# Patient Record
Sex: FEMALE | Race: WHITE | NOT HISPANIC OR LATINO | Employment: FULL TIME | URBAN - METROPOLITAN AREA
[De-identification: names, ages, dates, MRNs, and addresses within clinical notes are randomized per-mention and may not be internally consistent; named-entity substitution may affect disease eponyms.]

---

## 2017-12-01 ENCOUNTER — TRANSCRIBE ORDERS (OUTPATIENT)
Dept: ADMINISTRATIVE | Facility: HOSPITAL | Age: 42
End: 2017-12-01

## 2017-12-01 ENCOUNTER — ALLSCRIPTS OFFICE VISIT (OUTPATIENT)
Dept: OTHER | Facility: OTHER | Age: 42
End: 2017-12-01

## 2017-12-01 DIAGNOSIS — M51.16 NEURITIS OR RADICULITIS DUE TO RUPTURE OF LUMBAR INTERVERTEBRAL DISC: ICD-10-CM

## 2017-12-01 DIAGNOSIS — M54.16 LUMBAR RADICULOPATHY: ICD-10-CM

## 2017-12-01 DIAGNOSIS — Z98.890 PERSONAL HISTORY OF SURGERY TO HEART AND GREAT VESSELS, PRESENTING HAZARDS TO HEALTH: Primary | ICD-10-CM

## 2017-12-05 NOTE — PROGRESS NOTES
Assessment    1  Lumbar radiculopathy (724 4) (M54 16)   2  Lumbar disc herniation with radiculopathy (722 10) (M51 16)   3  History of lumbar surgery (V15 29) (Z98 890)   4  Family history of dementia (V17 2) (Z81 8) : Mother, Father   5  Family history of PTSD (post-traumatic stress disorder) : Mother, Father   10  Family history of Tachyarrhythmia : Mother, Father   9  History of Lumbar pain with radiation down leg (724 2) (M54 5)   8  History of Laminectomy Lumbar    Plan  Health Maintenance    · 1 - Manjit Lu MD, Mita Lindquist  (Obstetrics/Gynecology) Co-Management  *  Status: Active -Retrospective Authorization  Requested for: [id number]  Care Summary provided  : Yes  History of lumbar surgery, Lumbar disc herniation with radiculopathy, Lumbarradiculopathy    · 1 - Brandie Ca MD, Norma Wiley (Anesthesiology) Co-Management  *  Status: Active  Requestedfor: 13KVK2681  Care Summary provided  : Yes   · * MRI LUMBAR SPINE WO CONTRAST; Status:Need Information - Destin Rolle; Requested for:22Qku0265;   SocHx: Current every day smoker    · You need to quit smoking ; Status:Complete - Retrospective Authorization;   Done:[id number]    Discussion/Summary  Discussion Summary:   Discussed with patient will try to update the MRI of the lumbar spine with her radicular symptoms traveling down the left leg  Chief Complaint  Chief Complaint Free Text Note Form: Here to establish care      History of Present Illness  HPI: Patient here to establish care and just moved to the area  Patient last time was here was about 4 years ago  Patient has no acute issues to complain of but has some chronic issues  Patient reports that she has lower back pain and has a history of surgery discectomy on L5-S1 was done 10 years ago by Dr Kenzie Zelaya patient did well following the surgery, but now has pain again in the same area and similar pain she is experiencing   Pain is traveling down the side of the left leg and calf and hard to stay sitting and walking around  Patient has been having pain that has been getting progressively worse over the past several weeks  Moving around helps  Patient not currently taking any medication other than tylenol  Patient has had an MRI of the lumbar spine in 2/2007 showing a large central and subarticular zone disc herniation L5-S1      Review of Systems  Complete-Female:  Constitutional: as noted in HPI  Eyes: No complaints of eye pain, no red eyes, no eyesight problems, no discharge, no dry eyes, no itching of eyes  ENT: no complaints of earache, no loss of hearing, no nose bleeds, no nasal discharge, no sore throat, no hoarseness  Cardiovascular: No complaints of slow heart rate, no fast heart rate, no chest pain, no palpitations, no leg claudication, no lower extremity edema  Respiratory: No complaints of shortness of breath, no wheezing, no cough, no SOB on exertion, no orthopnea, no PND  Gastrointestinal: No complaints of abdominal pain, no constipation, no nausea or vomiting, no diarrhea, no bloody stools  Genitourinary: No complaints of dysuria, no incontinence, no pelvic pain, no dysmenorrhea, no vaginal discharge or bleeding  Musculoskeletal: as noted in HPI  Integumentary: No complaints of skin rash or lesions, no itching, no skin wounds, no breast pain or lump  Neurological: as noted in HPI  Psychiatric: Not suicidal, no sleep disturbance, no anxiety or depression, no change in personality, no emotional problems  ROS Reviewed:   ROS reviewed  Past Medical History  1  History of Lumbar pain with radiation down leg (724 2) (M54 5)  Active Problems And Past Medical History Reviewed: The active problems and past medical history were reviewed and updated today  Surgical History  1  History of Laminectomy Lumbar  Surgical History Reviewed: The surgical history was reviewed and updated today  Family History  Mother    1  Family history of dementia (V17 2) (Z81 8)   2   Family history of PTSD (post-traumatic stress disorder)   3  Family history of Tachyarrhythmia  Father    4  Family history of dementia (V17 2) (Z81 8)   5  Family history of PTSD (post-traumatic stress disorder)   6  Family history of Tachyarrhythmia  Family History Reviewed: The family history was reviewed and updated today  Social History   · Current every day smoker (305 1) (F17 200)  Social History Reviewed: The social history was reviewed and updated today  Current Meds   1  No Reported Medications Recorded    Allergies  1  No Known Drug Allergies    Vitals  Vital Signs    Recorded: 90OLN1102 10:44AM   Temperature 97 4 F   Heart Rate 68   Systolic 171   Diastolic 76   Height 5 ft 8 in   Weight 139 lb 6 08 oz   BMI Calculated 21 19   BSA Calculated 1 76   O2 Saturation 96       Physical Exam   Constitutional  General appearance: No acute distress, well appearing and well nourished  Pulmonary  Respiratory effort: No increased work of breathing or signs of respiratory distress  Auscultation of lungs: Clear to auscultation  Cardiovascular  Auscultation of heart: Normal rate and rhythm, normal S1 and S2, without murmurs  Examination of extremities for edema and/or varicosities: Normal    Abdomen  Abdomen: Non-tender, no masses  Liver and spleen: No hepatomegaly or splenomegaly  Skin  Skin and subcutaneous tissue: Normal without rashes or lesions  Psychiatric  Orientation to person, place, and time: Normal    Mood and affect: Abnormal  -- in pain  Lumbosacral Spine: Appearance: Normal  Prior lumbar surgery scar  Tenderness: lumbar spine (paraspinal, L5 and S1 )  Palpatory Findings include left-sided muscle spasms  Flexion was painful  Rotation to the left was painful  Rotation to the right was painful  Special Tests: positive Straight Leg Raise-- and-- Pain and shooting pain traveling down left leg and 3-4 lower extremity digitis        Signatures   Electronically signed by : Sebas Scott KRISTINA; Dec  1 2017 11:34AM EST                       (Author)    Electronically signed by : Maritza Verma MD; Dec  1 2017 11:42AM EST

## 2017-12-06 DIAGNOSIS — Z98.890 OTHER SPECIFIED POSTPROCEDURAL STATES: ICD-10-CM

## 2017-12-06 DIAGNOSIS — M51.16 INTERVERTEBRAL DISC DISORDER WITH RADICULOPATHY OF LUMBAR REGION: ICD-10-CM

## 2017-12-06 DIAGNOSIS — M54.16 RADICULOPATHY OF LUMBAR REGION: ICD-10-CM

## 2018-01-23 VITALS
SYSTOLIC BLOOD PRESSURE: 124 MMHG | TEMPERATURE: 97.4 F | DIASTOLIC BLOOD PRESSURE: 76 MMHG | OXYGEN SATURATION: 96 % | HEIGHT: 68 IN | HEART RATE: 68 BPM | WEIGHT: 139.38 LBS | BODY MASS INDEX: 21.12 KG/M2

## 2020-09-23 ENCOUNTER — OFFICE VISIT (OUTPATIENT)
Dept: URGENT CARE | Facility: CLINIC | Age: 45
End: 2020-09-23
Payer: COMMERCIAL

## 2020-09-23 VITALS
OXYGEN SATURATION: 100 % | RESPIRATION RATE: 16 BRPM | SYSTOLIC BLOOD PRESSURE: 132 MMHG | WEIGHT: 154 LBS | TEMPERATURE: 98.2 F | HEART RATE: 95 BPM | HEIGHT: 69 IN | DIASTOLIC BLOOD PRESSURE: 82 MMHG | BODY MASS INDEX: 22.81 KG/M2

## 2020-09-23 DIAGNOSIS — M54.6 ACUTE LEFT-SIDED THORACIC BACK PAIN: Primary | ICD-10-CM

## 2020-09-23 PROCEDURE — G0383 LEV 4 HOSP TYPE B ED VISIT: HCPCS | Performed by: PHYSICIAN ASSISTANT

## 2020-09-23 RX ORDER — IBUPROFEN 600 MG/1
600 TABLET ORAL EVERY 8 HOURS PRN
Qty: 30 TABLET | Refills: 0 | Status: SHIPPED | OUTPATIENT
Start: 2020-09-23

## 2020-09-23 RX ORDER — CYCLOBENZAPRINE HCL 10 MG
10 TABLET ORAL 3 TIMES DAILY PRN
Qty: 20 TABLET | Refills: 0 | Status: SHIPPED | OUTPATIENT
Start: 2020-09-23

## 2020-09-23 NOTE — PATIENT INSTRUCTIONS
Left sided thoracic back is consistent with being muscular in origin as it is reproducible on exam and worse with certain movements  -Apply warm, moist heat  -Gentle stretching  -Use Ibuprofen every 6-8 hours with food  -Use muscle relaxer as directed- do not drive or work while taking this  -Follow-up with PCP within 1 week    Go to ER with worsening symptoms, worsening pain, fever, numbness/tingling, bowel/bladder incontinence, weakness, or any new concerns

## 2022-07-17 PROCEDURE — 90471 IMMUNIZATION ADMIN: CPT

## 2022-07-17 PROCEDURE — 99282 EMERGENCY DEPT VISIT SF MDM: CPT

## 2022-07-18 ENCOUNTER — TELEPHONE (OUTPATIENT)
Dept: OBGYN CLINIC | Facility: HOSPITAL | Age: 47
End: 2022-07-18

## 2022-07-18 ENCOUNTER — HOSPITAL ENCOUNTER (EMERGENCY)
Facility: HOSPITAL | Age: 47
Discharge: HOME | End: 2022-07-18
Attending: EMERGENCY MEDICINE
Payer: COMMERCIAL

## 2022-07-18 VITALS
HEART RATE: 83 BPM | TEMPERATURE: 98.1 F | OXYGEN SATURATION: 98 % | SYSTOLIC BLOOD PRESSURE: 116 MMHG | RESPIRATION RATE: 18 BRPM | DIASTOLIC BLOOD PRESSURE: 62 MMHG

## 2022-07-18 DIAGNOSIS — S61.217A LACERATION OF LEFT LITTLE FINGER WITH TENDON INVOLVEMENT: Primary | ICD-10-CM

## 2022-07-18 PROCEDURE — 90715 TDAP VACCINE 7 YRS/> IM: CPT | Performed by: PHYSICIAN ASSISTANT

## 2022-07-18 PROCEDURE — 99284 EMERGENCY DEPT VISIT MOD MDM: CPT | Performed by: PHYSICIAN ASSISTANT

## 2022-07-18 PROCEDURE — 13121 CMPLX RPR S/A/L 2.6-7.5 CM: CPT | Performed by: PHYSICIAN ASSISTANT

## 2022-07-18 RX ORDER — BACITRACIN, NEOMYCIN, POLYMYXIN B 400; 3.5; 5 [USP'U]/G; MG/G; [USP'U]/G
1 OINTMENT TOPICAL ONCE
Status: COMPLETED | OUTPATIENT
Start: 2022-07-18 | End: 2022-07-18

## 2022-07-18 RX ORDER — LIDOCAINE HYDROCHLORIDE 10 MG/ML
5 INJECTION, SOLUTION EPIDURAL; INFILTRATION; INTRACAUDAL; PERINEURAL ONCE
Status: COMPLETED | OUTPATIENT
Start: 2022-07-18 | End: 2022-07-18

## 2022-07-18 RX ORDER — AMOXICILLIN AND CLAVULANATE POTASSIUM 875; 125 MG/1; MG/1
1 TABLET, FILM COATED ORAL ONCE
Status: COMPLETED | OUTPATIENT
Start: 2022-07-18 | End: 2022-07-18

## 2022-07-18 RX ORDER — AMOXICILLIN AND CLAVULANATE POTASSIUM 875; 125 MG/1; MG/1
1 TABLET, FILM COATED ORAL EVERY 12 HOURS
Qty: 14 TABLET | Refills: 0 | Status: SHIPPED | OUTPATIENT
Start: 2022-07-18 | End: 2022-07-25

## 2022-07-18 RX ADMIN — TETANUS TOXOID, REDUCED DIPHTHERIA TOXOID AND ACELLULAR PERTUSSIS VACCINE, ADSORBED 0.5 ML: 5; 2.5; 8; 8; 2.5 SUSPENSION INTRAMUSCULAR at 06:19

## 2022-07-18 RX ADMIN — LIDOCAINE HYDROCHLORIDE 5 ML: 10 INJECTION, SOLUTION EPIDURAL; INFILTRATION; INTRACAUDAL at 03:20

## 2022-07-18 RX ADMIN — AMOXICILLIN AND CLAVULANATE POTASSIUM 1 TABLET: 875; 125 TABLET, FILM COATED ORAL at 06:19

## 2022-07-18 RX ADMIN — BACITRACIN ZINC, NEOMYCIN, POLYMYXIN B 1 LARGE APPLICATION: 400; 3.5; 5 OINTMENT TOPICAL at 03:20

## 2022-07-18 RX ADMIN — LIDOCAINE HYDROCHLORIDE 5 ML: 10 INJECTION, SOLUTION EPIDURAL; INFILTRATION; INTRACAUDAL; PERINEURAL at 05:32

## 2022-07-18 NOTE — TELEPHONE ENCOUNTER
Hello,  Please advise if the following patient can be forced onto the schedule:    Patient: Tamra Humera     : 1975    MRN: [id number]    Call back #: [phone]    Insurance: [facility]    Reason for appointment: Laceration of left little finger with tendon involvement      Requested doctor/location: [place]      Thank you

## 2022-07-18 NOTE — Clinical Note
Chio Link was seen and treated in our emergency department on 7/17/2022  Diagnosis:     Lacretia Shelbie  may return to work on return date  She may return on this date: 07/22/2022         If you have any questions or concerns, please don't hesitate to call        Td Schneider PA-C    ______________________________           _______________          _______________  Hospital Representative                              Date                                Time

## 2022-07-18 NOTE — ED PROVIDER NOTES
History  Chief Complaint   Patient presents with    Laceration     Pt arrived ambulatory with c/o a laceration on her left hand  Pt was attempting to take her keys off the key ring when she tripped with her finger stuck in the key ring  Bleeding is controlled     Patient is a 49-year-old female with no significant past medical history presenting to the emergency department for evaluation of laceration to her left 5th finger  She states that she was grabbing her key off of a key ring when she fell  Her finger got stuck in the key ring and caused a laceration  She is reporting inability to flex her left 5th finger  There is no head strike or loss of consciousness  Tetanus shot is not up-to-date  No other complaints at this time  Prior to Admission Medications   Prescriptions Last Dose Informant Patient Reported? Taking? cyclobenzaprine (FLEXERIL) 10 mg tablet   No No   Sig: Take 1 tablet (10 mg total) by mouth 3 (three) times a day as needed for muscle spasms   ibuprofen (MOTRIN) 600 mg tablet   No No   Sig: Take 1 tablet (600 mg total) by mouth every 8 (eight) hours as needed for mild pain      Facility-Administered Medications: None       History reviewed  No pertinent past medical history  Past Surgical History:   Procedure Laterality Date    BACK SURGERY  2007    L5-s1       History reviewed  No pertinent family history  I have reviewed and agree with the history as documented  E-Cigarette/Vaping    E-Cigarette Use Never User      E-Cigarette/Vaping Substances     Social History     Tobacco Use    Smoking status: Current Every Day Smoker     Packs/day: 0 50    Smokeless tobacco: Never Used   Vaping Use    Vaping Use: Never used   Substance Use Topics    Alcohol use: Yes    Drug use: Not Currently       Review of Systems   Constitutional: Negative for chills, diaphoresis and fever  HENT: Negative for congestion, facial swelling, nosebleeds, sore throat and voice change      Eyes: Negative for pain and redness  Respiratory: Negative for cough, choking, chest tightness, shortness of breath and stridor  Cardiovascular: Negative for chest pain and palpitations  Musculoskeletal: Negative for arthralgias, back pain, myalgias, neck pain and neck stiffness  Skin: Positive for wound  Negative for color change and rash  Neurological: Negative for dizziness, syncope, facial asymmetry, weakness, light-headedness, numbness and headaches  Psychiatric/Behavioral: Negative for confusion and suicidal ideas  All other systems reviewed and are negative  Physical Exam  Physical Exam  Vitals reviewed  Constitutional:       General: She is not in acute distress  Appearance: Normal appearance  She is not ill-appearing, toxic-appearing or diaphoretic  HENT:      Head: Normocephalic and atraumatic  Right Ear: External ear normal       Left Ear: External ear normal       Nose: Nose normal  No congestion or rhinorrhea  Eyes:      General: No scleral icterus  Right eye: No discharge  Left eye: No discharge  Extraocular Movements: Extraocular movements intact  Conjunctiva/sclera: Conjunctivae normal    Cardiovascular:      Rate and Rhythm: Normal rate and regular rhythm  Pulses: Normal pulses  Heart sounds: Normal heart sounds  No murmur heard  No friction rub  No gallop  Pulmonary:      Effort: Pulmonary effort is normal  No respiratory distress  Breath sounds: Normal breath sounds  No stridor  No wheezing, rhonchi or rales  Musculoskeletal:      Cervical back: Normal range of motion and neck supple  Right lower leg: No edema  Left lower leg: No edema  Skin:     General: Skin is warm and dry  Capillary Refill: Capillary refill takes less than 2 seconds  Findings: Laceration (Complex laceration to the left 5th finger  6 cm in total length    Jagged, vertical   Patient unable to flex left 5th finger at the D IP or PIP, suspicious for tendon rupture ) present  Neurological:      General: No focal deficit present  Mental Status: She is alert and oriented to person, place, and time  Psychiatric:         Mood and Affect: Mood normal          Behavior: Behavior normal          Vital Signs  ED Triage Vitals [07/18/22 0001]   Temperature Pulse Respirations Blood Pressure SpO2   97 7 °F (36 5 °C) 103 19 (!) 171/107 99 %      Temp Source Heart Rate Source Patient Position - Orthostatic VS BP Location FiO2 (%)   Oral Monitor Sitting Left arm --      Pain Score       --           Vitals:    07/18/22 0001 07/18/22 0534   BP: (!) 171/107 116/62   Pulse: 103 83   Patient Position - Orthostatic VS: Sitting          Visual Acuity      ED Medications  Medications   lidocaine (PF) (XYLOCAINE-MPF) 1 % injection 5 mL (5 mL Infiltration Given 7/18/22 0320)   neomycin-bacitracin-polymyxin b (NEOSPORIN) ointment 1 large application (1 large application Topical Given 7/18/22 0320)   lidocaine (PF) (XYLOCAINE-MPF) 1 % injection 5 mL (5 mL Infiltration Given 7/18/22 0532)   amoxicillin-clavulanate (AUGMENTIN) 875-125 mg per tablet 1 tablet (1 tablet Oral Given 7/18/22 0619)   tetanus-diphtheria-acellular pertussis (BOOSTRIX) IM injection 0 5 mL (0 5 mL Intramuscular Given 7/18/22 1979)       Diagnostic Studies  Results Reviewed     None                 No orders to display              Procedures  Laceration repair    Date/Time: 7/18/2022 5:00 AM  Performed by: Varinder Luna PA-C  Authorized by: Varinder Luna PA-C   Consent: Verbal consent obtained    Risks and benefits: risks, benefits and alternatives were discussed  Consent given by: patient  Patient understanding: patient states understanding of the procedure being performed  Patient consent: the patient's understanding of the procedure matches consent given  Procedure consent: procedure consent matches procedure scheduled  Patient identity confirmed: verbally with patient, arm band and hospital-assigned identification number  Body area: upper extremity  Location details: left small finger  Laceration length: 6 cm  Foreign bodies: no foreign bodies  Tendon involvement: complex  Nerve involvement: none  Vascular damage: no  Anesthesia: digital block    Anesthesia:  Local Anesthetic: lidocaine 1% without epinephrine  Anesthetic total: 3 mL    Sedation:  Patient sedated: no      Wound Dehiscence:  Superficial Wound Dehiscence: simple closure      Procedure Details:  Preparation: Patient was prepped and draped in the usual sterile fashion  Irrigation solution: saline  Irrigation method: syringe  Amount of cleaning: extensive  Debridement: none  Degree of undermining: none  Skin closure: 4-0 nylon  Technique: simple  Approximation: close  Approximation difficulty: complex  Dressing: antibiotic ointment, 4x4 sterile gauze and splint  Patient tolerance: patient tolerated the procedure well with no immediate complications               ED Course                                             MDM  Number of Diagnoses or Management Options  Laceration of left little finger with tendon involvement  Diagnosis management comments: Patient presenting for evaluation of complex laceration to her left little finger  She has a 6 cm jagged, vertical, gaping laceration through her left 5th finger  There appears to be tendon involvement as patient is unable to flex through the PIP or D IP  I spoke to orthopedic surgery on-call who recommended approximation of the wound, placing in a splint and starting on oral antibiotics  She should follow up with Hand surgery as soon as possible  Wound was adequately approximated with 4-0 Ethilon after being copiously irrigated  She was initiated on Augmentin  Tetanus shot was updated  She was placed in an ulnar gutter splint by 1 of our emergency department technicians  She was discharged home with instructions to follow-up with hand surgery    Strict return precautions were discussed  She is in stable condition at time of discharge    Patient Progress  Patient progress: stable      Disposition  Final diagnoses:   Laceration of left little finger with tendon involvement     Time reflects when diagnosis was documented in both MDM as applicable and the Disposition within this note     Time User Action Codes Description Comment    7/18/2022  5:47 AM Claire Rowley Add [[id number]] Laceration of left little finger with tendon involvement       ED Disposition     ED Disposition   Discharge    Condition   Stable    Date/Time   Mon Jul 18, 2022  6:11 AM    Comment   Beatriz Hansen discharge to home/self care  Follow-up Information     Follow up With Specialties Details Why Contact Info Additional [address] [facility] Emergency Department Emergency Medicine Go to  If symptoms worsen [facility] Emergency Department, [address] [place], [place], [address] [address], MD Orthopedic Surgery, Hand Surgery Schedule an appointment as soon as possible for a visit  ASAP for follow up [address] [facility] [address]             Discharge Medication List as of 7/18/2022  6:28 AM      START taking these medications    Details   amoxicillin-clavulanate (AUGMENTIN) 875-125 mg per tablet Take 1 tablet by mouth every 12 (twelve) hours for 7 days, Starting Mon 7/18/2022, Until Mon 7/25/2022, Print         CONTINUE these medications which have NOT CHANGED    Details   cyclobenzaprine (FLEXERIL) 10 mg tablet Take 1 tablet (10 mg total) by mouth 3 (three) times a day as needed for muscle spasms, Starting Wed 9/23/2020, Normal      ibuprofen (MOTRIN) 600 mg tablet Take 1 tablet (600 mg total) by mouth every 8 (eight) hours as needed for mild pain, Starting Wed 9/23/2020, Normal             No discharge procedures on file      PDMP Review     None ED Provider  Electronically Signed by           Jamie Nam PA-C  08/01/22 7490

## 2022-07-19 ENCOUNTER — OFFICE VISIT (OUTPATIENT)
Dept: OBGYN CLINIC | Facility: HOSPITAL | Age: 47
End: 2022-07-19
Payer: COMMERCIAL

## 2022-07-19 ENCOUNTER — OFFICE VISIT (OUTPATIENT)
Dept: OCCUPATIONAL THERAPY | Age: 47
End: 2022-07-19

## 2022-07-19 VITALS
HEART RATE: 69 BPM | WEIGHT: 144 LBS | BODY MASS INDEX: 21.33 KG/M2 | HEIGHT: 69 IN | DIASTOLIC BLOOD PRESSURE: 85 MMHG | SYSTOLIC BLOOD PRESSURE: 126 MMHG

## 2022-07-19 DIAGNOSIS — S66.829A FLEXOR TENDON LACERATION OF HAND WITH OPEN WOUND, INITIAL ENCOUNTER: Primary | ICD-10-CM

## 2022-07-19 DIAGNOSIS — S61.209A FLEXOR TENDON LACERATION OF FINGER WITH OPEN WOUND, INITIAL ENCOUNTER: Primary | ICD-10-CM

## 2022-07-19 DIAGNOSIS — S61.409A FLEXOR TENDON LACERATION OF HAND WITH OPEN WOUND, INITIAL ENCOUNTER: Primary | ICD-10-CM

## 2022-07-19 DIAGNOSIS — S56.129A FLEXOR TENDON LACERATION OF FINGER WITH OPEN WOUND, INITIAL ENCOUNTER: Primary | ICD-10-CM

## 2022-07-19 PROCEDURE — 97165 OT EVAL LOW COMPLEX 30 MIN: CPT | Performed by: OCCUPATIONAL THERAPIST

## 2022-07-19 PROCEDURE — 99204 OFFICE O/P NEW MOD 45 MIN: CPT | Performed by: PHYSICIAN ASSISTANT

## 2022-07-19 NOTE — PROGRESS NOTES
ASSESSMENT/PLAN:      Diagnoses and all orders for this visit:    Flexor tendon laceration of left small finger FDP tendon  · Both conservative and operative treatments were discussed with the patient at length  She would like to proceed with conservative measures at this time as it is her nondominant hand small finger  · Wound care was discussed to keep it clean and dry, she can use warm soapy water to run over the incision  · She was advised to attend therapy to work on range of motion and custom ulnar gutter dorsal blocking splint  · Continue antibiotics as prescribed  · She will follow up in 1 week for suture removal    Follow Up:  Return in about 1 week (around 7/26/2022) for for suture removal     Work/school status:  No use of the left upper extremity    To Do Next Visit:  Re-evaluation of current issue, sutures out      Operative Discussions:  Flexor Tendon Repair: The patient has elected to undergo operative repair of a lacerated flexor tendon  During surgery, an incision will be made in the palmar aspect of the hand which will be extended proximally to the level of tendon retraction  The tendon will be delivered distally, and sutured to the remaining portion of the tendon  Postoperatively, the splint will be applied in a protected position  Postoperative physical therapy for up to 12-16 weeks is a necessity to help improve outcomes  The risks of the surgery include tendon rupture, stiffness, pain, incomplete motion, and approximately a 20-30% chance for repeat surgery such as tenolysis  The risks and benefits of the procedure were explained to the patient, which include, but are not limited to: Bleeding, infection, recurrence, pain, scar, damage to tendons, damage to nerves, and damage to blood vessels, failure to give desired results and complications related to anesthesia    These risks, along with alternative conservative treatment options, and postoperative protocols were voiced back and understood by the patient  All questions were answered to the patient's satisfaction  The patient agrees to comply with a standard postoperative protocol, and is willing to proceed  Education was provided via written and auditory forms  There were no barriers to learning  Written handouts regarding wound care, incision and scar care, and general preoperative information was provided to the patient  Prior to surgery, the patient may be requested to stop all anti-inflammatory medications  Prophylactic aspirin, Plavix, and Coumadin may be allowed to be continued  Medications including vitamin E , ginkgo, and fish oil are requested to be stopped approximately one week prior to surgery  Hypertensive medications and beta blockers, if taken, should be continued  CHIEF COMPLAINT:  Chief Complaint   Patient presents with    Left Hand - Pain       SUBJECTIVE:  Jessica Chávez is a 55y o  year old RHD female who presents left small finger pain  Patient states that she was grabbing and he off the key ring when she fell on 7/17/2022  Her finger got stuck in the key ring causing a laceration  She went to the emergency room for evaluation  There, she was unable to flex the digit at the PIP and DIP joint  Laceration was sutured  Tetanus shot was given  She was instructed to follow up Orthopedics  Today she presents with some discomfort in the small finger she states she can feel a pulse in the digit  She denies any numbness or tingling  PAST MEDICAL HISTORY:  History reviewed  No pertinent past medical history  PAST SURGICAL HISTORY:  Past Surgical History:   Procedure Laterality Date    BACK SURGERY  2007    L5-s1       FAMILY HISTORY:  History reviewed  No pertinent family history      SOCIAL HISTORY:  Social History     Tobacco Use    Smoking status: Current Every Day Smoker     Packs/day: 0 50    Smokeless tobacco: Never Used   Vaping Use    Vaping Use: Never used   Substance Use Topics    Alcohol use:  Yes    Drug use: Not Currently       MEDICATIONS:    Current Outpatient Medications:     amoxicillin-clavulanate (AUGMENTIN) 875-125 mg per tablet, Take 1 tablet by mouth every 12 (twelve) hours for 7 days, Disp: 14 tablet, Rfl: 0    cyclobenzaprine (FLEXERIL) 10 mg tablet, Take 1 tablet (10 mg total) by mouth 3 (three) times a day as needed for muscle spasms, Disp: 20 tablet, Rfl: 0    ibuprofen (MOTRIN) 600 mg tablet, Take 1 tablet (600 mg total) by mouth every 8 (eight) hours as needed for mild pain, Disp: 30 tablet, Rfl: 0    ALLERGIES:  No Known Allergies    REVIEW OF SYSTEMS:  Review of Systems  ROS:   General: no fever, no chills  HEENT:  No loss of hearing or eyesight problems  Eyes:  No red eyes  Respiratory:  No coughing, shortness of breath or wheezing  Cardiovascular:  No chest pain, no palpitations  GI:  Abdomen soft nontender, denies nausea  Endocrine:  No muscle weakness, no frequent urination, no excessive thirst  Urinary:  No dysuria, no incontinence  Musculoskeletal: see HPI and PE  SKIN:  No skin rash, no dry skin  Neurological:  No headaches, no confusion  Psychiatric:  No suicide thoughts, no anxiety, no depression  Review of all other systems is negative    VITALS:  Vitals:    07/19/22 0922   BP: 126/85   Pulse: 69       LABS:  HgA1c: No results found for: HGBA1C  BMP: No results found for: GLUCOSE, CALCIUM, NA, K, CO2, CL, BUN, CREATININE    _____________________________________________________  PHYSICAL EXAMINATION:  General: well developed and well nourished, alert, oriented times 3 and appears comfortable  Psychiatric: Normal  HEENT: Trachea Midline, No torticollis  Pulmonary: No audible wheezing or strider  Cardiovascular: No discernable arrhythmia   Skin: No masses, erythema, lacerations, fluctation, ulcerations  Neurovascular: Sensation Intact to the Median, Ulnar, Radial Nerve, Motor Intact to the Median, Ulnar, Radial Nerve and Pulses Intact    MUSCULOSKELETAL EXAMINATION:  Left small finger  Linear laceration with small v shape at the PIP joint  Sutures are intact  Slight maceration of the laceration noted  No active drainage  FDS tendon appears to be intact and able to flex the PIP joint  FDP tendon is not able to flex the DIP joint  Sensation is intact to light touch  Capillary refill less than 2 seconds    ___________________________________________________  STUDIES REVIEWED:  No studies reviewed  PROCEDURES PERFORMED:  Procedures  No Procedures performed today            Portions of the record may have been created with voice recognition software  Occasional wrong word or "sound a like" substitutions may have occurred due to the inherent limitations of voice recognition software  Read the chart carefully and recognize, using context, where substitutions have occurred

## 2022-07-19 NOTE — PROGRESS NOTES
Orthosis    Diagnosis:   1  Flexor tendon laceration of hand with open wound, initial encounter       Indication: Motion Blocking    Location: Left  small finger  Supplies: Custom Fit Orthotic  Orthosis type: Dorsal Block  Wearing Schedule: Remove for hygiene only  Describe Position: Wrist 30 degrees flexion, MPs 60 flexion, Ips 0 ext - added ulnar gutter extension per request    Precautions: Tendon repair,    Patient or Caregiver expresses understanding of wearing Schedule and Precautions? Yes  Patient or Caregiver able to don/doff orthotic independently? Yes    Written orders provided to patient?  No  Orders Obtained: Written  Orders Obtained from: Ramiro Walters    Return for evaluation and treatment Yes

## 2022-07-25 ENCOUNTER — EVALUATION (OUTPATIENT)
Dept: OCCUPATIONAL THERAPY | Facility: CLINIC | Age: 47
End: 2022-07-25
Payer: COMMERCIAL

## 2022-07-25 DIAGNOSIS — S56.129A FLEXOR TENDON LACERATION OF FINGER WITH OPEN WOUND, INITIAL ENCOUNTER: Primary | ICD-10-CM

## 2022-07-25 DIAGNOSIS — S61.209A FLEXOR TENDON LACERATION OF FINGER WITH OPEN WOUND, INITIAL ENCOUNTER: Primary | ICD-10-CM

## 2022-07-25 PROCEDURE — 97140 MANUAL THERAPY 1/> REGIONS: CPT

## 2022-07-25 PROCEDURE — 97166 OT EVAL MOD COMPLEX 45 MIN: CPT

## 2022-07-25 PROCEDURE — 97535 SELF CARE MNGMENT TRAINING: CPT

## 2022-07-25 NOTE — PROGRESS NOTES
OT Evaluation     Today's date: 2022  Patient name: Saida Barrera  : 1975  MRN: [id number]  Referring provider: Tavon Garcia PA-C  Dx:   Encounter Diagnosis     ICD-10-CM    1  Flexor tendon laceration of finger with open wound, initial encounter  S56 129A Ambulatory Referral to PT/OT Hand Therapy    S61 209A                   Assessment  Assessment details: Patient presenting to OP OT services s/p left flexor tendon repair  Patient initially sustained injury on 2022 when the patient was cut it on a key ring  Patient initially went to ER that day  Patient initially saw Ortho on 2022  Patient and surgeon opted to not complete surgery  Patient was placed in a dorsal blocking splint since 2022  Therapist will be following formal tendon repair protocol  Patient does not have a follow-up with Dr Thedora Spatz  Patient is right hand dominant  Patient works as an  and has returned to work since initial injury  Impairments: abnormal or restricted ROM, activity intolerance, impaired physical strength and pain with function    Symptom irritability: moderateBarriers to therapy: No past medical history on file  Understanding of Dx/Px/POC: good   Prognosis: good    Goals  STGs    Pt will increase  strength by 5-10#  - Not Met    Pt will increase digit ROM by 50%  - Not Met     Pt will demonstrate decrease in edema by 25%  - Not Met    Pt will report a decrease in sensation deficits by 25%  - Not Met    Independent with HEP  - Not Met    Patient will verbalize compliance of splint wear - Not Met      LTGs     Pt will increase  strength by an additional 5-10#  - Not Met    Pt will increase digit ROM to WNL  - Not Met    Pt will demonstrate decrease in edema by 50%   - Not Met    Pt will increase pinch strength by 3-5#  - Not Met    Pt will demonstrate an improvement in [facility] as evident by decreased time to complete 9-hole peg test  - Not Met    Pt will report a decrease in sensation deficits by 50%  - Not Met        Plan  Plan details: Patient presenting to OP OT services s/p left flexor tendon repair  Patient demonstrating increased pain, decreased strength, decreased ROM and decreased activity  Pt would benefit from continued Occupational Therapy services one times per week for 6+ weeks to return to prior level of function and achieve all established goals  Patient is limited to number of visits per week secondary to high co-pay   Thank you for the referral!    Patient would benefit from: custom splinting, OT eval and skilled occupational therapy  Referral necessary: Yes  Planned modality interventions: cryotherapy, thermotherapy: hydrocollator packs, thermotherapy: paraffin bath, ultrasound and unattended electrical stimulation  Planned therapy interventions: manual therapy, massage, joint mobilization, fine motor coordination training, dressing changes, coordination, self care, patient education, neuromuscular re-education, strengthening, stretching, therapeutic activities, therapeutic exercise and home exercise program  Frequency: 1x week  Duration in visits: 8  Duration in weeks: 8  Plan of Care beginning date: 2022  Plan of Care expiration date: 2022  Treatment plan discussed with: patient        Subjective Evaluation    History of Present Illness  Date of onset: 2022  Mechanism of injury: surgery  Mechanism of injury: Flexor tendon laceration of left SF          Not a recurrent problem   Quality of life: good    Pain  Current pain ratin  At best pain ratin  At worst pain ratin  Location: Left SF  Quality: dull ache  Relieving factors: medications and ice    Social Support    Employment status: working  Hand dominance: right    Treatments  Current treatment: immobilization and occupational therapy  Patient Goals  Patient goals for therapy: decreased edema, decreased pain, increased motion, increased strength and independence with ADLs/IADLs          Objective     Observations     Left Wrist/Hand   Positive for incision  Additional Observation Details  Patient presenting with a 3 cm incision along left palmar aspect of SF with sutures present  Therapist removed sutures as per protocol   Patient presenting with significant scabbing over incision site    Neurological Testing     Sensation     Wrist/Hand   Left   Diminished: light touch    Right   Intact: light touch  Diminished: light touch    Additional Neurological Details  Sensation deficits along left ulnar aspect of SF and along incision site    Active Range of Motion     Left Wrist   Wrist flexion: 75 degrees   Wrist extension: 65 degrees   Radial deviation: 20 degrees   Ulnar deviation: 20 degrees     Right Wrist   Normal active range of motion    Left Thumb   Opposition: WNL    Right Thumb   Opposition: WNL    Right Digits   Flexion   Little     MCP: 80    PIP: 0    DIP: 0    Additional Active Range of Motion Details  Patient presenting with a 5cm distance from tip of SF to [facility]    Strength/Myotome Testing     Left Wrist/Hand   Wrist extension: 3  Wrist flexion: 3  Radial deviation: 3  Ulnar deviation: 3     (2nd hand position)     Trial 1: 0    Thumb Strength  Key/Lateral Pinch     Trial 1: 0  Tip/Two-Point Pinch     Trial 1: 0  Palmar/Three-Point Pinch     Trial 1: 0    Right Wrist/Hand   Normal wrist strength     (2nd hand position)     Trial 1: 100    Thumb Strength   Key/Lateral Pinch     Trial 1: 15  Tip/Two-Point Pinch     Trial 1: 15  Palmar/Three-Point Pinch     Trial 1: 20    Additional Strength Details   and pinch strength NT on left    Swelling     Left Wrist/Hand   Little     Proximal: 5 8 cm    Middle: 5 6 cm    Distal: 5 cm    Additional Swelling Details  Increased swelling noted through left SF    Right Wrist/Hand   Little     Proximal: 5 cm    Middle: 4 6 cm    Distal: 4 4 cm  Neuro Exam:     Functional outcomes   Right 9 hole peg test: 15 94 (seconds)                Precautions Zone 2 SF Flexor Tendon Laceration       Manuals 07/25/2022       Scar Massage Suture Removal/ Wound Cleaning       IASTM                Neuro Re-Ed  07/25/2022                                                               Ther Ex 07/25/2022       Blocking  DIP  PIP  MCP        Tendon Glide Hook HEP  Table Top HEP       FDS [place]        Wrist Flex/Ext  RD/UD W/ MCP Flexed HEP       Wrist Tenodesis        Digi-Flex        Place and Hold Composite Fist HEP                               Ther Activity 07/25/2022       Grooved Peg Board                                Modalities 07/25/2022        Completed before suture removal       CP        Splint Modification

## 2022-07-29 ENCOUNTER — TELEPHONE (OUTPATIENT)
Dept: OBGYN CLINIC | Facility: HOSPITAL | Age: 47
End: 2022-07-29

## 2022-07-29 NOTE — TELEPHONE ENCOUNTER
Patient states OT removed stitches and finger does not look good, cracking and pussy, please return call  She has picture but not sure how to load on [employer]

## 2022-08-01 ENCOUNTER — OFFICE VISIT (OUTPATIENT)
Dept: OBGYN CLINIC | Facility: CLINIC | Age: 47
End: 2022-08-01
Payer: COMMERCIAL

## 2022-08-01 VITALS
BODY MASS INDEX: 21.33 KG/M2 | SYSTOLIC BLOOD PRESSURE: 118 MMHG | HEIGHT: 69 IN | WEIGHT: 144 LBS | DIASTOLIC BLOOD PRESSURE: 72 MMHG

## 2022-08-01 DIAGNOSIS — S56.129A FLEXOR TENDON LACERATION OF FINGER WITH OPEN WOUND, INITIAL ENCOUNTER: Primary | ICD-10-CM

## 2022-08-01 DIAGNOSIS — S61.209A FLEXOR TENDON LACERATION OF FINGER WITH OPEN WOUND, INITIAL ENCOUNTER: Primary | ICD-10-CM

## 2022-08-01 PROCEDURE — 99214 OFFICE O/P EST MOD 30 MIN: CPT | Performed by: ORTHOPAEDIC SURGERY

## 2022-08-01 NOTE — PROGRESS NOTES
ASSESSMENT/PLAN:    Assessment:   Left small finger FDP tendon laceration     Plan:   Patient elected to proceed without surgical intervention  We did discuss she will never have the ability to flex at the DIP joint due to the laceration of the tendon  Her numbness may improve but may not fully resolve as she likely damage to the nerve with her laceration  Wound is healing well with no signs of infection  She does have slight maceration about the wound and we discussed washing the wound with soap and water daily patting dry and using a nonocclusive dressing such as Coban and about the finger  She may continue with therapy per protocol  Follow Up:  4  week(s)    To Do Next Visit:    wound check      _____________________________________________________  CHIEF COMPLAINT:  Chief Complaint   Patient presents with    Left Little Finger - Pain         SUBJECTIVE:  Dwayne Guo is a 55 y o  female who presents with laceration her left small finger  Patient sustained laceration on 07/17/2022 on a key ring  She did have the inability to flex at the PIP joint after this injury  She was previously seen by PA Emery Gosselin  Patient did have her sutures removed by Occupational therapy  She states initially her wound is very macerated as she was using Neosporin about the wound  Today she notes no significant pain about the finger  The wound has dried out of it as she has been doing Betadine soaks  She denies any signs of infection  She has completed her oral antibiotics  She states she does have a bit of numbness at the ulnar aspect of the small finger  She has been attending hand therapy  She is right-hand dominant  She does not wish to consider surgery for tendon repair  PAST MEDICAL HISTORY:  History reviewed  No pertinent past medical history  PAST SURGICAL HISTORY:  Past Surgical History:   Procedure Laterality Date    BACK SURGERY  2007    L5-s1       FAMILY HISTORY:  History reviewed  No pertinent family history  SOCIAL HISTORY:  Social History     Tobacco Use    Smoking status: Current Every Day Smoker     Packs/day: 0 50    Smokeless tobacco: Never Used   Vaping Use    Vaping Use: Never used   Substance Use Topics    Alcohol use: Yes    Drug use: Not Currently       MEDICATIONS:    Current Outpatient Medications:     ibuprofen (MOTRIN) 600 mg tablet, Take 1 tablet (600 mg total) by mouth every 8 (eight) hours as needed for mild pain, Disp: 30 tablet, Rfl: 0    cyclobenzaprine (FLEXERIL) 10 mg tablet, Take 1 tablet (10 mg total) by mouth 3 (three) times a day as needed for muscle spasms, Disp: 20 tablet, Rfl: 0    ALLERGIES:  No Known Allergies    REVIEW OF SYSTEMS:  Pertinent items are noted in HPI  LABS:  HgA1c: No results found for: HGBA1C  BMP: No results found for: GLUCOSE, CALCIUM, NA, K, CO2, CL, BUN, CREATININE      _____________________________________________________  PHYSICAL EXAMINATION:  Vital signs: Ht 5' 9" (1 753 m)   Wt 65 3 kg (144 lb)   BMI 21 27 kg/m²   General: well developed and well nourished, alert, oriented times 3 and appears comfortable  Psychiatric: Normal  HEENT: Trachea Midline, No torticollis  Cardiovascular: No discernable arrhythmia  Pulmonary: No wheezing or stridor  Abdomen: No rebound or guarding  Extremities: No peripheral edema  Skin: No masses  Neurovascular: Sensation Intact to the Median, Ulnar, Radial Nerve, Motor Intact to the Median, Ulnar, Radial Nerve and Pulses Intact    MUSCULOSKELETAL EXAMINATION:  Left small finger:  Palmar laceration in line with the middle phalanx extending to the distal phalanx  No erythema or drainage noted  Wound is slightly macerated  FDS intact  No function of the FDP noted  Slightly diminished sensation to the ulnar aspect of the distal phalanx    Brisk capillary refill noted  _____________________________________________________  STUDIES REVIEWED:  No Studies to review      PROCEDURES PERFORMED:  Procedures  No Procedures performed today   Scribe Attestation    I,:  Keturah Cooney am acting as a scribe while in the presence of the attending physician :       I,:  Zaria Blackwell MD personally performed the services described in this documentation    as scribed in my presence :

## 2022-08-04 ENCOUNTER — OFFICE VISIT (OUTPATIENT)
Dept: OCCUPATIONAL THERAPY | Facility: CLINIC | Age: 47
End: 2022-08-04
Payer: COMMERCIAL

## 2022-08-04 DIAGNOSIS — S61.209A FLEXOR TENDON LACERATION OF FINGER WITH OPEN WOUND, INITIAL ENCOUNTER: Primary | ICD-10-CM

## 2022-08-04 DIAGNOSIS — S66.829A FLEXOR TENDON LACERATION OF HAND WITH OPEN WOUND, INITIAL ENCOUNTER: ICD-10-CM

## 2022-08-04 DIAGNOSIS — S56.129A FLEXOR TENDON LACERATION OF FINGER WITH OPEN WOUND, INITIAL ENCOUNTER: Primary | ICD-10-CM

## 2022-08-04 DIAGNOSIS — S61.409A FLEXOR TENDON LACERATION OF HAND WITH OPEN WOUND, INITIAL ENCOUNTER: ICD-10-CM

## 2022-08-04 PROCEDURE — 97110 THERAPEUTIC EXERCISES: CPT

## 2022-08-04 PROCEDURE — 97140 MANUAL THERAPY 1/> REGIONS: CPT

## 2022-08-04 NOTE — PROGRESS NOTES
Daily Note     Today's date: 2022  Patient name: Rebecca Byrnes  : 1975  MRN: [id number]  Referring provider: Tonia Waldrop PA-C  Dx:   Encounter Diagnosis     ICD-10-CM    1  Flexor tendon laceration of finger with open wound, initial encounter  S56 129A     S61 209A    2  Flexor tendon laceration of hand with open wound, initial encounter  C01 [id number]     S61 409A                   Subjective: My finger is stiff and numb  It's in the way a bit      Objective: See treatment diary below  AROM LSF PIP -    Assessment: Tolerated treatment well  Patient exhibited good technique with therapeutic exercises  Reviewed HEP  Improved AROM LSF PIP joint  Thick scabbing over wound      Plan: Continue per plan of care           Precautions Zone 2 SF Flexor Tendon Laceration       Date        Visit  2      Manuals 2022       Scar Massage Suture Removal/ Wound Cleaning       IASTM        Edema massage  8' + coban wrap      Neuro Re-Ed  2022                                                               Ther Ex 2022       Blocking  DIP  PIP  MCP        Tendon Glide Hook HEP  Table Top HEP x20 ea      FDS Glide  x20      Wrist Flex/Ext  RD/UD W/ MCP Flexed HEP x20 ea      Wrist Tenodesis  x20      Digi-Flex        Place and Hold Composite Fist HEP x20                              Ther Activity 2022       Grooved Peg Board                                Modalities 2022        Completed before suture removal       CP        Splint Modification

## 2022-08-11 ENCOUNTER — OFFICE VISIT (OUTPATIENT)
Dept: OCCUPATIONAL THERAPY | Facility: CLINIC | Age: 47
End: 2022-08-11
Payer: COMMERCIAL

## 2022-08-11 DIAGNOSIS — S56.129A FLEXOR TENDON LACERATION OF FINGER WITH OPEN WOUND, INITIAL ENCOUNTER: Primary | ICD-10-CM

## 2022-08-11 DIAGNOSIS — S66.829A FLEXOR TENDON LACERATION OF HAND WITH OPEN WOUND, INITIAL ENCOUNTER: ICD-10-CM

## 2022-08-11 DIAGNOSIS — S61.209A FLEXOR TENDON LACERATION OF FINGER WITH OPEN WOUND, INITIAL ENCOUNTER: Primary | ICD-10-CM

## 2022-08-11 DIAGNOSIS — S61.409A FLEXOR TENDON LACERATION OF HAND WITH OPEN WOUND, INITIAL ENCOUNTER: ICD-10-CM

## 2022-08-11 PROCEDURE — 97140 MANUAL THERAPY 1/> REGIONS: CPT

## 2022-08-11 PROCEDURE — 97110 THERAPEUTIC EXERCISES: CPT

## 2022-08-11 NOTE — PROGRESS NOTES
Daily Note     Today's date: 2022  Patient name: Bryan Fatima  : 1975  MRN: [id number]  Referring provider: Cristela Blanca PA-C  Dx:   Encounter Diagnosis     ICD-10-CM    1  Flexor tendon laceration of finger with open wound, initial encounter  S56 129A     S61 209A    2  Flexor tendon laceration of hand with open wound, initial encounter  [id number] [id number]     S61 409A                   Subjective: I think the swelling has gone down      Objective: See treatment diary below      Assessment: Tolerated treatment well  Patient exhibited good technique with therapeutic exercises  Remolded splint due to decreased edema  Debrided 25% of scab from small finger      Plan: Continue per plan of care           Precautions Zone 2 SF Flexor Tendon Laceration       Date      Visit 1 2 3     Manuals 2022       Scar Massage Suture Removal/ Wound Cleaning  Debrided 25% of scab     IASTM        Edema massage  8' + coban wrap 8' + coban     Splint modification   10' due to decrease edema     Neuro Re-Ed  2022                                                               Ther Ex 2022       Passive flexion/active ext in splint   x20     Tendon Glide Hook HEP  Table Top HEP x20 ea x20 ea     FDS Glide  x20 x20     Wrist Flex/Ext  RD/UD W/ MCP Flexed HEP x20 ea      Wrist Tenodesis  x20 x20     Digi-Flex        Place and Hold Composite Fist HEP x20 x20                             Ther Activity 2022       Grooved Peg Board                                Modalities 2022       MH Completed before suture removal  5'     CP

## 2022-08-18 ENCOUNTER — OFFICE VISIT (OUTPATIENT)
Dept: OCCUPATIONAL THERAPY | Facility: CLINIC | Age: 47
End: 2022-08-18
Payer: COMMERCIAL

## 2022-08-18 DIAGNOSIS — S66.829A FLEXOR TENDON LACERATION OF HAND WITH OPEN WOUND, INITIAL ENCOUNTER: ICD-10-CM

## 2022-08-18 DIAGNOSIS — S61.409A FLEXOR TENDON LACERATION OF HAND WITH OPEN WOUND, INITIAL ENCOUNTER: ICD-10-CM

## 2022-08-18 DIAGNOSIS — S61.209A FLEXOR TENDON LACERATION OF FINGER WITH OPEN WOUND, INITIAL ENCOUNTER: Primary | ICD-10-CM

## 2022-08-18 DIAGNOSIS — S56.129A FLEXOR TENDON LACERATION OF FINGER WITH OPEN WOUND, INITIAL ENCOUNTER: Primary | ICD-10-CM

## 2022-08-18 PROCEDURE — 97140 MANUAL THERAPY 1/> REGIONS: CPT

## 2022-08-18 PROCEDURE — 97110 THERAPEUTIC EXERCISES: CPT

## 2022-08-18 NOTE — PROGRESS NOTES
Daily Note     Today's date: 2022  Patient name: Bryan Fatima  : 1975  MRN: [id number]  Referring provider: Cristela Blanca PA-C  Dx:   Encounter Diagnosis     ICD-10-CM    1  Flexor tendon laceration of finger with open wound, initial encounter  S56 129A     S61 209A    2  Flexor tendon laceration of hand with open wound, initial encounter  [id number] [id number]     S61 409A                   Subjective: My hand feels pretty good  Just discomfort when my finger gets cold  Objective: See treatment diary below      Assessment: Tolerated treatment well  Patient exhibited good technique with therapeutic exercises  Patient is now 4 weeks post injury  Cut down splint to allow wrist extension to 45 degrees      Plan: Continue per plan of care           Precautions Zone 2 SF Flexor Tendon Laceration       Date     Visit 1 2 3 4    Manuals 2022       Scar Massage Suture Removal/ Wound Cleaning  Debrided 25% of scab 5'    IASTM        Edema massage  8' + coban wrap 8' + coban 3'    Splint modification   10' due to decrease edema     Neuro Re-Ed  2022                                                               Ther Ex 2022       Passive flexion/active ext in splint   x20 x20    Tendon Glide Hook HEP  Table Top HEP x20 ea x20 ea     FDS Glide  x20 x20     Wrist Flex/Ext  RD/UD W/ MCP Flexed HEP x20 ea  x20    Wrist Tenodesis  x20 x20 x20    Digi-Flex        Place and Hold Composite Fist HEP x20 x20 x20                            Ther Activity 2022       Grooved Peg Board        Opposition    Lg and small colored pegs                    Modalities 2022       MH Completed before suture removal  5' 5'    CP

## 2022-08-25 ENCOUNTER — OFFICE VISIT (OUTPATIENT)
Dept: OCCUPATIONAL THERAPY | Facility: CLINIC | Age: 47
End: 2022-08-25
Payer: COMMERCIAL

## 2022-08-25 DIAGNOSIS — S61.209A FLEXOR TENDON LACERATION OF FINGER WITH OPEN WOUND, INITIAL ENCOUNTER: Primary | ICD-10-CM

## 2022-08-25 DIAGNOSIS — S56.129A FLEXOR TENDON LACERATION OF FINGER WITH OPEN WOUND, INITIAL ENCOUNTER: Primary | ICD-10-CM

## 2022-08-25 DIAGNOSIS — S66.829A FLEXOR TENDON LACERATION OF HAND WITH OPEN WOUND, INITIAL ENCOUNTER: ICD-10-CM

## 2022-08-25 DIAGNOSIS — S61.409A FLEXOR TENDON LACERATION OF HAND WITH OPEN WOUND, INITIAL ENCOUNTER: ICD-10-CM

## 2022-08-25 PROCEDURE — 97140 MANUAL THERAPY 1/> REGIONS: CPT

## 2022-08-25 PROCEDURE — 97110 THERAPEUTIC EXERCISES: CPT

## 2022-08-25 NOTE — PROGRESS NOTES
OT Re-Evaluation     Today's date: 2022  Patient name: Nilda Duncan  : 1975  MRN: [id number]  Referring provider: Petra Verdugo PA-C  Dx:   Encounter Diagnosis     ICD-10-CM    1  Flexor tendon laceration of finger with open wound, initial encounter  S56 129A     S61 209A    2  Flexor tendon laceration of hand with open wound, initial encounter  [id number] [id number]     S61 409A                   Assessment  Assessment details: Patient presenting to OP OT services s/p left flexor tendon repair  Patient initially sustained injury on 2022 when the patient was cut it on a key ring  Patient initially went to ER that day  Patient initially saw Ortho on 2022  Patient and surgeon opted to not complete surgery  Patient was placed in a dorsal blocking splint since 2022  Therapist will be following formal tendon repair protocol  Patient does not have a follow-up with Dr López Verma  Patient is right hand dominant  Patient works as an  and has returned to work since initial injury  22: Patient has been seen 5 times in OT  She has been in dorsal blocking splint for 5 weeks  Splint cut down to allow wrist motion 1 week ago  DC splint in 1 week  Incision is now 98% healed  Edema has decreased, but not resolved  Improved sensation and AROM in the small finger and wrist  Strength not tested  Continue OT 1 x/wk x 4 weeks  Impairments: abnormal or restricted ROM, activity intolerance, impaired physical strength and pain with function    Symptom irritability: moderateBarriers to therapy: No past medical history on file  Understanding of Dx/Px/POC: good   Prognosis: good    Goals  STGs    Pt will increase  strength by 5-10#  - Not Adressed    Pt will increase digit ROM by 50%  - Met     Pt will demonstrate decrease in edema by 25%  -  Met    Pt will report a decrease in sensation deficits by 25%   -  Met    Independent with HEP  -  Met    Patient will verbalize compliance of splint wear -  Met      LTGs     Pt will increase  strength by an additional 5-10#  - Not Met    Pt will increase digit ROM to WNL  - Not Met    Pt will demonstrate decrease in edema by 50%  - Not Met    Pt will increase pinch strength by 3-5#  - Not Met    Pt will demonstrate an improvement in [address] as evident by decreased time to complete 9-hole peg test  - Not Met    Pt will report a decrease in sensation deficits by 50%  -  Met        Plan  Plan details: Patient presenting to OP OT services s/p left flexor tendon repair  Patient demonstrating increased pain, decreased strength, decreased ROM and decreased activity  Pt would benefit from continued Occupational Therapy services one times per week for 6+ weeks to return to prior level of function and achieve all established goals  Patient is limited to number of visits per week secondary to high co-pay   Thank you for the referral!    8/25/22: Continue OT 1x/wk x 8 weeks    Patient would benefit from: custom splinting and skilled occupational therapy  Referral necessary: Yes  Planned modality interventions: cryotherapy, thermotherapy: hydrocollator packs, thermotherapy: paraffin bath, ultrasound and unattended electrical stimulation  Planned therapy interventions: manual therapy, massage, joint mobilization, fine motor coordination training, dressing changes, coordination, self care, patient education, neuromuscular re-education, strengthening, stretching, therapeutic activities, therapeutic exercise, home exercise program and compression  Other planned therapy interventions: Scar and edema mgt  Frequency: 1x week  Duration in weeks: 12  Plan of Care beginning date: 7/25/2022  Plan of Care expiration date: 10/28/2022  Treatment plan discussed with: patient        Subjective Evaluation    History of Present Illness  Date of onset: 7/17/2022  Mechanism of injury: trauma  Mechanism of injury: Flexor tendon laceration of left SF    8/25/22: It's still hard in the finger and doesn't bend  I think it's scar tissue  I can't really use the hand because of the splint            Not a recurrent problem   Quality of life: good    Pain  Current pain ratin  At best pain ratin  At worst pain ratin  Location: Left SF  Quality: discomfort  Relieving factors: heat  Exacerbated by: Cold weather/temperature  Progression: improved    Social Support    Employment status: working  Hand dominance: right    Treatments  Current treatment: immobilization and occupational therapy  Patient Goals  Patient goals for therapy: decreased edema, decreased pain, increased motion, increased strength and independence with ADLs/IADLs          Objective     Observations     Left Wrist/Hand   Positive for adhesive scar and edema  Additional Observation Details  Patient presenting with a 3 cm incision along left palmar aspect of SF with sutures present  Therapist removed sutures as per protocol  Patient presenting with significant scabbing over incision site    22: Scabbing on digit now 98% removed  Brawny edema small finger    Neurological Testing     Sensation     Wrist/Hand   Left   Intact: light touch    Right   Intact: light touch  Diminished: light touch    Comments   Left light touch: 2 83 all dgits  Decreased sensation on scar     Active Range of Motion     Left Wrist   Normal active range of motion  Wrist flexion: 85 degrees   Wrist extension: 85 degrees   Radial deviation: 20 degrees   Ulnar deviation: 40 degrees     Right Wrist   Normal active range of motion    Left Thumb   Opposition: WNL    Right Thumb   Opposition: WNL    Left Digits    Flexion   Little     MCP: 95    PIP: 70    DIP: 0  Extension   Little     PIP: -5    Right Digits   Flexion   Little     MCP: 80    PIP: 0    DIP: 0    Additional Active Range of Motion Details  22: 3cm distance from tip of SF to [facility] LSF = 160    Strength/Myotome Testing     Left Wrist/Hand   Normal wrist strength     (2nd hand position) Trial 1: 0    Thumb Strength  Key/Lateral Pinch     Trial 1: 0  Tip/Two-Point Pinch     Trial 1: 0  Palmar/Three-Point Pinch     Trial 1: 0    Right Wrist/Hand   Normal wrist strength     (2nd hand position)     Trial 1: 100    Thumb Strength   Key/Lateral Pinch     Trial 1: 15  Tip/Two-Point Pinch     Trial 1: 15  Palmar/Three-Point Pinch     Trial 1: 20    Additional Strength Details   and pinch strength NT on left  8/25/22:  and pinch not tested   Wrist strength WNL    Swelling     Left Wrist/Hand   Little     Proximal: 5 3 cm    Middle: 5 cm    Distal: 4 5 cm    Additional Swelling Details  Increased swelling noted through left SF    Right Wrist/Hand   Little     Proximal: 5 cm    Middle: 4 6 cm    Distal: 4 4 cm  Neuro Exam:     Functional outcomes   Right 9 hole peg test: 15 94 (seconds)                Precautions Zone 2 SF Flexor Tendon Laceration       Date 7/25 8/4 8/11 8/18 8/25   Visit 1 2 3 4 5 Re eval   Manuals 07/25/2022       Scar Massage Suture Removal/ Wound Cleaning  Debrided 25% of scab 5' 5' + eschar deridement   IASTM        Edema massage  8' + coban wrap 8' + coban 3' 5' + coban   Splint modification   10' due to decrease edema     Neuro Re-Ed  07/25/2022                                                               Ther Ex 07/25/2022       Passive flexion/active ext in splint   x20 x20 x20   Tendon Glide Hook HEP  Table Top HEP x20 ea x20 ea     FDS Glide  x20 x20  x20   Wrist Flex/Ext  RD/UD W/ MCP Flexed HEP x20 ea  x20 x20   Wrist Tenodesis  x20 x20 x20 x20   Digi-Flex        Place and Hold Composite Fist HEP x20 x20 x20 x20                           Ther Activity 07/25/2022       Grooved Peg Board        Opposition    Lg and small colored pegs                    Modalities 07/25/2022       MH Completed before suture removal  5' 5' 5'   CP

## 2022-08-30 ENCOUNTER — TELEPHONE (OUTPATIENT)
Dept: OBGYN CLINIC | Facility: HOSPITAL | Age: 47
End: 2022-08-30

## 2022-09-01 ENCOUNTER — OFFICE VISIT (OUTPATIENT)
Dept: OCCUPATIONAL THERAPY | Facility: CLINIC | Age: 47
End: 2022-09-01
Payer: COMMERCIAL

## 2022-09-01 DIAGNOSIS — S61.409A FLEXOR TENDON LACERATION OF HAND WITH OPEN WOUND, INITIAL ENCOUNTER: ICD-10-CM

## 2022-09-01 DIAGNOSIS — S56.129D FLEXOR TENDON LACERATION OF FINGER WITH OPEN WOUND, SUBSEQUENT ENCOUNTER: Primary | ICD-10-CM

## 2022-09-01 DIAGNOSIS — S61.209D FLEXOR TENDON LACERATION OF FINGER WITH OPEN WOUND, SUBSEQUENT ENCOUNTER: Primary | ICD-10-CM

## 2022-09-01 DIAGNOSIS — S66.829A FLEXOR TENDON LACERATION OF HAND WITH OPEN WOUND, INITIAL ENCOUNTER: ICD-10-CM

## 2022-09-01 PROCEDURE — 97140 MANUAL THERAPY 1/> REGIONS: CPT

## 2022-09-01 PROCEDURE — 97110 THERAPEUTIC EXERCISES: CPT

## 2022-09-01 NOTE — PROGRESS NOTES
Daily Note     Today's date: 2022  Patient name: Mehdi Mcfarlane  : 1975  MRN: [id number]  Referring provider: Genesis Power PA-C  Dx:   Encounter Diagnosis     ICD-10-CM    1  Flexor tendon laceration of finger with open wound, subsequent encounter  S56 129D     S61 209D    2  Flexor tendon laceration of hand with open wound, initial encounter  C08 220F     S61 409A                   Subjective: I got the rest of the scab off  Objective: See treatment diary below      Assessment: Tolerated treatment well  Patient exhibited good technique with therapeutic exercises  Patient has now been in splint for 6 weeks  DC splint this date  Patient will tequila tape small finger to ring finger during the day and wear coban on small finger at night for edema control      Plan: Continue per plan of care           Precautions [facility] Flexor Tendon Laceration       Date    Visit 6 2 3 4 5 Re eval   Manuals        Scar Massage 5'  Debrided 25% of scab 5' 5' + eschar deridement   IASTM 3'       Edema massage 5' 8' + coban wrap 8' + coban 3' 5' + coban   Splint modification DC splint  10' due to decrease edema     Neuro Re-Ed                                                                 Ther Ex        Passive flexion/active ext in splint DC  x20 x20 x20   Tendon Glide  x20 ea x20 ea     FDS Glide x20 x20 x20  x20   Wrist Flex/Ext  RD/UD W/ MCP Flexed x20 x20 ea  x20 x20   Wrist Tenodesis  x20 x20 x20 x20   Digi-Flex        Place and Hold Composite Fist x20 x20 x20 x20 x20   PROM SF 12'                       Ther Activity        Grooved Peg Board        Opposition    Lg and small colored pegs                    Modalities        MH 5'  5' 5' 5'   CP

## 2022-09-08 ENCOUNTER — OFFICE VISIT (OUTPATIENT)
Dept: OCCUPATIONAL THERAPY | Facility: CLINIC | Age: 47
End: 2022-09-08
Payer: COMMERCIAL

## 2022-09-08 DIAGNOSIS — S61.409A FLEXOR TENDON LACERATION OF HAND WITH OPEN WOUND, INITIAL ENCOUNTER: ICD-10-CM

## 2022-09-08 DIAGNOSIS — S56.129D FLEXOR TENDON LACERATION OF FINGER WITH OPEN WOUND, SUBSEQUENT ENCOUNTER: Primary | ICD-10-CM

## 2022-09-08 DIAGNOSIS — S66.829A FLEXOR TENDON LACERATION OF HAND WITH OPEN WOUND, INITIAL ENCOUNTER: ICD-10-CM

## 2022-09-08 DIAGNOSIS — S61.209A FLEXOR TENDON LACERATION OF FINGER WITH OPEN WOUND, INITIAL ENCOUNTER: ICD-10-CM

## 2022-09-08 DIAGNOSIS — S56.129A FLEXOR TENDON LACERATION OF FINGER WITH OPEN WOUND, INITIAL ENCOUNTER: ICD-10-CM

## 2022-09-08 DIAGNOSIS — S61.209D FLEXOR TENDON LACERATION OF FINGER WITH OPEN WOUND, SUBSEQUENT ENCOUNTER: Primary | ICD-10-CM

## 2022-09-08 PROCEDURE — 97110 THERAPEUTIC EXERCISES: CPT

## 2022-09-08 PROCEDURE — 97140 MANUAL THERAPY 1/> REGIONS: CPT

## 2022-09-08 NOTE — TELEPHONE ENCOUNTER
Pt called and left a vm stating she needs to reschedule her appt for tomorrow with Davon Greene     Pt can be reached at [phone]

## 2022-09-08 NOTE — PROGRESS NOTES
Daily Note     Today's date: 2022  Patient name: Tesfaye Hernandez  : 1975  MRN: [id number]  Referring provider: Matheus Sanchez PA-C  Dx:   Encounter Diagnosis     ICD-10-CM    1  Flexor tendon laceration of finger with open wound, subsequent encounter  S56 129D     S61 209D    2  Flexor tendon laceration of hand with open wound, initial encounter  Y33 753X     S61 409A    3  Flexor tendon laceration of finger with open wound, initial encounter  S56 129A     S61 209A                   Subjective: I can move better, but it's still stiff      Objective: See treatment diary below      Assessment: Tolerated treatment well  Patient exhibited good technique with therapeutic exercises  Fabricated and issued DIP static flexion splint in 20 degees flexion to wear during the day to encourage PIP flexion      Plan: Continue per plan of care           Precautions [address] [facility] Flexor Tendon Laceration       Date    Visit 6 7 3 4 5 Re eval   Manuals        Scar Massage 5'  Debrided 25% of scab 5' 5' + eschar deridement   IASTM 3'       Edema massage 5' 8'  8' + coban 3' 5' + coban   Splint modification DC splint Static, dorsal PIP flexion in 20 degrees 10' due to decrease edema     Neuro Re-Ed                                                                 Ther Ex        Passive flexion/active ext in splint DC  x20 x20 x20   Tendon Glide  x20 ea x20 ea     FDS Glide x20 x20 x20  x20   Wrist Flex/Ext  RD/UD W/ MCP Flexed x20 x20 ea  x20 x20   Wrist Tenodesis   x20 x20 x20   Digi-Flex        Place and Hold Composite Fist x20 x20 x20 x20 x20   PROM SF 12' 10'                      Ther Activity        Grooved Peg Board        Opposition    Lg and small colored pegs                    Modalities        MH 5' 5' 5' 5' 5'   CP

## 2022-09-15 ENCOUNTER — OFFICE VISIT (OUTPATIENT)
Dept: OCCUPATIONAL THERAPY | Facility: CLINIC | Age: 47
End: 2022-09-15
Payer: COMMERCIAL

## 2022-09-15 DIAGNOSIS — S56.129A FLEXOR TENDON LACERATION OF FINGER WITH OPEN WOUND, INITIAL ENCOUNTER: ICD-10-CM

## 2022-09-15 DIAGNOSIS — S56.129D FLEXOR TENDON LACERATION OF FINGER WITH OPEN WOUND, SUBSEQUENT ENCOUNTER: Primary | ICD-10-CM

## 2022-09-15 DIAGNOSIS — S61.209D FLEXOR TENDON LACERATION OF FINGER WITH OPEN WOUND, SUBSEQUENT ENCOUNTER: Primary | ICD-10-CM

## 2022-09-15 DIAGNOSIS — S61.409A FLEXOR TENDON LACERATION OF HAND WITH OPEN WOUND, INITIAL ENCOUNTER: ICD-10-CM

## 2022-09-15 DIAGNOSIS — S66.829A FLEXOR TENDON LACERATION OF HAND WITH OPEN WOUND, INITIAL ENCOUNTER: ICD-10-CM

## 2022-09-15 DIAGNOSIS — S61.209A FLEXOR TENDON LACERATION OF FINGER WITH OPEN WOUND, INITIAL ENCOUNTER: ICD-10-CM

## 2022-09-15 PROCEDURE — 97140 MANUAL THERAPY 1/> REGIONS: CPT

## 2022-09-15 PROCEDURE — 97110 THERAPEUTIC EXERCISES: CPT

## 2022-09-15 NOTE — PROGRESS NOTES
Daily Note     Today's date: 9/15/2022  Patient name: Jessica Chávez  : 1975  MRN: [id number]  Referring provider: Álvaro King PA-C  Dx:   Encounter Diagnosis     ICD-10-CM    1  Flexor tendon laceration of finger with open wound, subsequent encounter  S56 129D     S61 209D    2  Flexor tendon laceration of hand with open wound, initial encounter  K79 334D     S61 409A    3  Flexor tendon laceration of finger with open wound, initial encounter  Z73 905T     X51 169S                   Subjective: My finger is less stiff, but I have to work it a lot  I'm trying to use it to type, but I make a lot of mistakes      Objective: See treatment diary below      Assessment: Tolerated treatment well  Patient exhibited good technique with therapeutic exercises  PIP flexion 75 degrees  Improved 5 degrees      Plan: Continue per plan of care           Precautions Zone 2 SF Flexor Tendon Laceration       Date 9/1 9/8 9/15 8/18 8/25   Visit 6 7 8 4 5 Re eval   Manuals        Scar Massage 5'   5' 5' + eschar deridement   IASTM 3'  7'     Edema massage 5' 8'  5' 3' 5' + coban   Splint modification DC splint Static, dorsal PIP flexion in 20 degrees      Neuro Re-Ed                                                                 Ther Ex        Passive flexion/active ext in splint DC   x20 x20   Tendon Glide  x20 ea x20 ea     FDS Glide x20 x20 x20  x20   Wrist Flex/Ext  RD/UD W/ MCP Flexed x20 x20 ea  x20 x20   Wrist Tenodesis    x20 x20   Digi-Flex        Place and Hold Composite Fist x20 x20 x20 x20 x20   PROM SF 12' 10' 10'     Sustained grasp w/pencils   2 sets             Ther Activity        Grooved Peg Board        Opposition    Lg and small colored pegs                    Modalities        MH 5' 5' 5' 5' 5'   CP

## 2022-10-06 ENCOUNTER — OFFICE VISIT (OUTPATIENT)
Dept: OCCUPATIONAL THERAPY | Facility: CLINIC | Age: 47
End: 2022-10-06
Payer: COMMERCIAL

## 2022-10-06 DIAGNOSIS — S56.129D FLEXOR TENDON LACERATION OF FINGER WITH OPEN WOUND, SUBSEQUENT ENCOUNTER: Primary | ICD-10-CM

## 2022-10-06 DIAGNOSIS — S61.409A FLEXOR TENDON LACERATION OF HAND WITH OPEN WOUND, INITIAL ENCOUNTER: ICD-10-CM

## 2022-10-06 DIAGNOSIS — S61.209D FLEXOR TENDON LACERATION OF FINGER WITH OPEN WOUND, SUBSEQUENT ENCOUNTER: Primary | ICD-10-CM

## 2022-10-06 DIAGNOSIS — S61.209A FLEXOR TENDON LACERATION OF FINGER WITH OPEN WOUND, INITIAL ENCOUNTER: ICD-10-CM

## 2022-10-06 DIAGNOSIS — S66.829A FLEXOR TENDON LACERATION OF HAND WITH OPEN WOUND, INITIAL ENCOUNTER: ICD-10-CM

## 2022-10-06 DIAGNOSIS — S56.129A FLEXOR TENDON LACERATION OF FINGER WITH OPEN WOUND, INITIAL ENCOUNTER: ICD-10-CM

## 2022-10-06 PROCEDURE — 97110 THERAPEUTIC EXERCISES: CPT

## 2022-10-06 PROCEDURE — 97140 MANUAL THERAPY 1/> REGIONS: CPT

## 2022-10-06 NOTE — PROGRESS NOTES
OT Re-Evaluation     Today's date: 10/6/2022  Patient name: Nilda Duncan  : 1975  MRN: [id number]  Referring provider: Petra Verdugo PA-C  Dx:   Encounter Diagnosis     ICD-10-CM    1  Flexor tendon laceration of finger with open wound, subsequent encounter  S56 129D     S61 209D    2  Flexor tendon laceration of hand with open wound, initial encounter  P49 073P     S61 409A    3  Flexor tendon laceration of finger with open wound, initial encounter  Y25 636O     U93 902Q                   Assessment  Assessment details: Patient presenting to OP OT services s/p left flexor tendon repair  Patient initially sustained injury on 2022 when the patient was cut it on a key ring  Patient initially went to ER that day  Patient initially saw Ortho on 2022  Patient and surgeon opted to not complete surgery  Patient was placed in a dorsal blocking splint since 2022  Therapist will be following formal tendon repair protocol  Patient does not have a follow-up with Dr López Verma  Patient is right hand dominant  Patient works as an  and has returned to work since initial injury  22: Patient has been seen 5 times in OT  She has been in dorsal blocking splint for 5 weeks  Splint cut down to allow wrist motion 1 week ago  DC splint in 1 week  Incision is now 98% healed  Edema has decreased, but not resolved  Improved sensation and AROM in the small finger and wrist  Strength not tested  Continue OT 1 x/wk x 4 weeks    10/6/22: Patient seen 4 times in OT this period, but she has not been seen in the past 2 weeks  Small improvement in LSF ROM and edema  The small finger remains edematous, with dense scar tissue that limits motion  Initial hand strength measurements indicate pinch strength WNL, but mild deficit in  strength noted  Only minimal discomfort reported   Continue OT x 3 more visits  Impairments: abnormal or restricted ROM, activity intolerance, impaired physical strength and pain with function    Symptom irritability: moderateBarriers to therapy: No past medical history on file  Understanding of Dx/Px/POC: good   Prognosis: good    Goals  STGs  Pt will increase  strength by 5-10#  - Initiated  Pt will increase digit ROM by 50%  - Met   Pt will demonstrate decrease in edema by 25%  -  Met  Pt will report an increase in sensation deficits by 25%  -  Met  Independent with HEP  -  Met  Patient will verbalize compliance of splint wear -  Met  LTGs   Pt will increase  strength by an additional 5-10#  - Not Met  Pt will increase digit ROM to WNL  - Not Met  Pt will demonstrate decrease in edema by 50%  - Not Met  Pt will increase pinch strength by 3-5#  - Met  Pt will demonstrate an improvement in [facility] as evident by decreased time to complete 9-hole peg test  - Not Met  Pt will report an increase in sensation deficits by 50%  -  Met        Plan  Plan details: Patient presenting to OP OT services s/p left flexor tendon repair  Patient demonstrating increased pain, decreased strength, decreased ROM and decreased activity  Pt would benefit from continued Occupational Therapy services one times per week for 6+ weeks to return to prior level of function and achieve all established goals  Patient is limited to number of visits per week secondary to high co-pay   Thank you for the referral!    8/25/22: Continue OT 1x/wk x 8 weeks    10/6/22: OT 1x/wk x 3-4 weeks    Patient would benefit from: custom splinting and skilled occupational therapy  Referral necessary: Yes  Planned modality interventions: cryotherapy, thermotherapy: hydrocollator packs, thermotherapy: paraffin bath, ultrasound and unattended electrical stimulation  Planned therapy interventions: manual therapy, massage, joint mobilization, fine motor coordination training, dressing changes, coordination, self care, patient education, neuromuscular re-education, strengthening, stretching, therapeutic activities, therapeutic exercise, home exercise program and compression  Other planned therapy interventions: Scar and edema mgt  Frequency: 1x week  Duration in weeks: 12  Plan of Care beginning date: 2022  Plan of Care expiration date: 2022  Treatment plan discussed with: patient        Subjective Evaluation    History of Present Illness  Date of onset: 2022  Mechanism of injury: trauma  Mechanism of injury: Flexor tendon laceration of left SF    22: It's still hard in the finger and doesn't bend  I think it's scar tissue  I can't really use the hand because of the splint    10/6/22: My little finger still gets in the way, but I try to use it as much as possible          Not a recurrent problem   Quality of life: good    Pain  Current pain ratin  At best pain ratin  At worst pain ratin  Location: Left SF  Quality: discomfort  Relieving factors: heat  Exacerbated by: Cold weather/temperature  Progression: resolved    Social Support    Employment status: working  Hand dominance: right    Treatments  Current treatment: immobilization and occupational therapy  Patient Goals  Patient goals for therapy: decreased edema, decreased pain, increased motion, increased strength and independence with ADLs/IADLs          Objective     Observations     Left Wrist/Hand   Positive for adhesive scar and edema  Additional Observation Details  Patient presenting with a 3 cm incision along left palmar aspect of SF with sutures present  Therapist removed sutures as per protocol  Patient presenting with significant scabbing over incision site    22: Scabbing on digit now 98% removed  Brawny edema small finger    10/6/22: Dense scar tissue    Neurological Testing     Sensation     Wrist/Hand   Left   Intact: light touch    Right   Intact: light touch  Diminished: light touch    Comments   Left light touch: 2 83 all dgits   Decreased sensation on scar     Active Range of Motion     Left Wrist   Normal active range of motion  Wrist flexion: 85 degrees   Wrist extension: 85 degrees   Radial deviation: 20 degrees   Ulnar deviation: 40 degrees     Right Wrist   Normal active range of motion    Left Thumb   Opposition: WNL    Right Thumb   Opposition: WNL    Left Digits    Flexion   Little     MCP: 95    PIP: 70    DIP: 0  Extension   Little     PIP: -3    Right Digits   Flexion   Little     MCP: 80    PIP: 0    DIP: 0    Additional Active Range of Motion Details  8/25/22: 3cm distance from tip of SF to [facility]  JONES LSF = 160  10/6/22: JONES = 162    Strength/Myotome Testing     Left Wrist/Hand   Normal wrist strength     (2nd hand position)     Trial 1: 77    Thumb Strength  Key/Lateral Pinch     Trial 1: 18  Tip/Two-Point Pinch     Trial 1: 16  Palmar/Three-Point Pinch     Trial 1: 20    Right Wrist/Hand   Normal wrist strength     (2nd hand position)     Trial 1: 90    Thumb Strength   Key/Lateral Pinch     Trial 1: 15  Tip/Two-Point Pinch     Trial 1: 11 5  Palmar/Three-Point Pinch     Trial 1: 20    Additional Strength Details   and pinch strength NT on left  8/25/22:  and pinch not tested   Wrist strength WNL  10/6/22: Initial  and pinch measurements    Swelling     Left Wrist/Hand   Little     Proximal: 5 5 cm    Middle: 4 7 cm    Distal: 4 cm    Additional Swelling Details  Increased swelling noted through left SF    Right Wrist/Hand   Little     Proximal: 5 cm    Middle: 4 6 cm    Distal: 4 4 cm  Neuro Exam:     Functional outcomes   Right 9 hole peg test: 15 94 (seconds)                Precautions Zone 2 [facility] Flexor Tendon Laceration       Date 9/1 9/8 9/15 10/6 8/25   Visit 6 7 8 9 Re eval 5 Re eval   Manuals        Scar Massage 5'   10' 5' + eschar deridement   IASTM 3'  7' 5'    Edema massage 5' 8'  5'  5' + coban   Splint modification DC splint Static, dorsal PIP flexion in 20 degrees      Neuro Re-Ed                                                                 Ther Ex        Passive flexion/active ext in splint DC    x20   Tendon Glide  x20 ea x20 ea x20    FDS Glide x20 x20 x20 x20 x20   Wrist Flex/Ext  RD/UD W/ MCP Flexed x20 x20 ea  x20 x20   Wrist Tenodesis     x20   Digi-Flex        Place and Hold Composite Fist x20 x20 x20 x20 x20   PROM SF 12' 10' 10' 7'    Sustained grasp w/pencils   2 sets             Ther Activity        Grooved Peg Board        Opposition                        Modalities        MH 5' 5' 5' 5' 5'   CP

## 2022-10-13 ENCOUNTER — OFFICE VISIT (OUTPATIENT)
Dept: OCCUPATIONAL THERAPY | Facility: CLINIC | Age: 47
End: 2022-10-13
Payer: COMMERCIAL

## 2022-10-13 DIAGNOSIS — S56.129D FLEXOR TENDON LACERATION OF FINGER WITH OPEN WOUND, SUBSEQUENT ENCOUNTER: Primary | ICD-10-CM

## 2022-10-13 DIAGNOSIS — S61.209D FLEXOR TENDON LACERATION OF FINGER WITH OPEN WOUND, SUBSEQUENT ENCOUNTER: Primary | ICD-10-CM

## 2022-10-13 PROCEDURE — 97110 THERAPEUTIC EXERCISES: CPT

## 2022-10-13 PROCEDURE — 97140 MANUAL THERAPY 1/> REGIONS: CPT

## 2022-10-20 ENCOUNTER — OFFICE VISIT (OUTPATIENT)
Dept: OCCUPATIONAL THERAPY | Facility: CLINIC | Age: 47
End: 2022-10-20
Payer: COMMERCIAL

## 2022-10-20 DIAGNOSIS — S56.129D FLEXOR TENDON LACERATION OF FINGER WITH OPEN WOUND, SUBSEQUENT ENCOUNTER: Primary | ICD-10-CM

## 2022-10-20 DIAGNOSIS — S61.209D FLEXOR TENDON LACERATION OF FINGER WITH OPEN WOUND, SUBSEQUENT ENCOUNTER: Primary | ICD-10-CM

## 2022-10-20 PROCEDURE — 97140 MANUAL THERAPY 1/> REGIONS: CPT

## 2022-10-20 PROCEDURE — 97110 THERAPEUTIC EXERCISES: CPT

## 2022-10-20 NOTE — PROGRESS NOTES
Daily Note     Today's date: 10/20/2022  Patient name: Carlos Barrera  : 1975  MRN: [id number]  Referring provider: Teresa Davis PA-C  Dx:   Encounter Diagnosis     ICD-10-CM    1  Flexor tendon laceration of finger with open wound, subsequent encounter  S56 129D     S61 209D                   Subjective: It stays swollen  I feel like the bending is staying the same      Objective: See treatment diary below      Assessment: Tolerated treatment well  Patient demonstrated fatigue post treatment  LSF is edematous and PIP flexion still impaired at 70 degrees  Patient is compliant with HEP  Patient states that when she coban wraps the finger, then she has difficulty exercising the finger against the coban    Plan: Continue per plan of care           Precautions Zone 2 [facility] Flexor Tendon Laceration       Date 10/20 9/8 9/15 10/6 10/13   Visit 11 7 8 9 Re eval 10   Manuals        Scar Massage 5'   10' 5'   IASTM 5'  7' 5' 5'   Edema massage 5' 8'  5'  5'    Splint modification  Static, dorsal PIP flexion in 20 degrees      Neuro Re-Ed                                                                 Ther Ex        Tendon Glide  x20 ea x20 ea x20 x20   FDS Glide x20 x20 x20 x20 x20   Wrist Flex/Ext  RD/UD W/ MCP Flexed x20 x20 ea  x20 x20   Wrist Tenodesis     x20   HG  35# x 30    35# x 20   Place and Hold Composite Fist x20 x20 x20 x20 x20   PROM LSF 5' 10' 10' 7' 10'   Sustained grasp w/pencils 2 set  2 sets             Ther Activity        Translation Marbles 1 set       Opposition                        Modalities        MH 5' 5' 5' 5' 5'   CP

## 2022-10-27 ENCOUNTER — OFFICE VISIT (OUTPATIENT)
Dept: OCCUPATIONAL THERAPY | Facility: CLINIC | Age: 47
End: 2022-10-27
Payer: COMMERCIAL

## 2022-10-27 DIAGNOSIS — S56.129D FLEXOR TENDON LACERATION OF FINGER WITH OPEN WOUND, SUBSEQUENT ENCOUNTER: Primary | ICD-10-CM

## 2022-10-27 DIAGNOSIS — S61.209D FLEXOR TENDON LACERATION OF FINGER WITH OPEN WOUND, SUBSEQUENT ENCOUNTER: Primary | ICD-10-CM

## 2022-10-27 PROCEDURE — 97110 THERAPEUTIC EXERCISES: CPT

## 2022-10-27 PROCEDURE — 97140 MANUAL THERAPY 1/> REGIONS: CPT

## 2022-10-27 NOTE — PROGRESS NOTES
Daily Note/Discharge     Today's date: 10/27/2022  Patient name: Nilton Heredia  : 1975  MRN: [id number]  Referring provider: Juan Dent PA-C  Dx:   Encounter Diagnosis     ICD-10-CM    1  Flexor tendon laceration of finger with open wound, subsequent encounter  S56 129D     S61 209D                   Subjective: I only have pain in the finger when it gets cold  It's just stiff      Objective: See treatment diary below  AROM LSF MP 0/95  PIP 0/75 DIP 0/0  JONES = 170   strength = 90 lbs bilaterally    Assessment: Tolerated treatment well  Patient exhibited good technique with therapeutic exercises  Strength is now WNL  AROM continues to slowly improve  Patient is independent in HEP        Plan: Discharge to HEP        Precautions Zone 2 [facility] Flexor Tendon Laceration       Date 10/20 10/27 9/15 10/6 10/13   Visit 11 12 8 9 Re eval 10   Manuals        Scar Massage 5' 5'  10' 5'   IASTM 5' 5' 7' 5' 5'   Edema massage 5'  5'  5'    Splint modification        Neuro Re-Ed                                                                 Ther Ex        Tendon Glide  x20 ea x20 ea x20 x20   FDS Glide x20 x20 x20 x20 x20   Wrist Flex/Ext  RD/UD W/ MCP Flexed x20 x20 ea  x20 x20   Wrist Tenodesis     x20   HG  35# x 30 35# x 30   35# x 20   Place and Hold Composite Fist x20 x20 x20 x20 x20   PROM LSF 5' 10' 10' 7' 10'   Sustained grasp w/pencils 2 set 2 sets 2 sets             Ther Activity        Translation Marbles 1 set       Opposition                        Modalities        MH 5' 5' 5' 5' 5'   CP

## 2023-06-19 ENCOUNTER — OFFICE VISIT (OUTPATIENT)
Dept: FAMILY MEDICINE CLINIC | Facility: CLINIC | Age: 48
End: 2023-06-19
Payer: COMMERCIAL

## 2023-06-19 VITALS
HEIGHT: 67 IN | OXYGEN SATURATION: 98 % | WEIGHT: 150 LBS | HEART RATE: 67 BPM | SYSTOLIC BLOOD PRESSURE: 130 MMHG | DIASTOLIC BLOOD PRESSURE: 82 MMHG | BODY MASS INDEX: 23.54 KG/M2 | TEMPERATURE: 98.5 F

## 2023-06-19 DIAGNOSIS — Z13.1 SCREENING FOR DIABETES MELLITUS: ICD-10-CM

## 2023-06-19 DIAGNOSIS — Z12.31 ENCOUNTER FOR SCREENING MAMMOGRAM FOR MALIGNANT NEOPLASM OF BREAST: ICD-10-CM

## 2023-06-19 DIAGNOSIS — G44.229 CHRONIC TENSION-TYPE HEADACHE, NOT INTRACTABLE: ICD-10-CM

## 2023-06-19 DIAGNOSIS — Z13.220 SCREENING FOR LIPID DISORDERS: ICD-10-CM

## 2023-06-19 DIAGNOSIS — Z01.419 ENCOUNTER FOR ANNUAL ROUTINE GYNECOLOGICAL EXAMINATION: ICD-10-CM

## 2023-06-19 DIAGNOSIS — M54.16 LUMBAR RADICULOPATHY: ICD-10-CM

## 2023-06-19 DIAGNOSIS — R51.9 NONINTRACTABLE HEADACHE, UNSPECIFIED CHRONICITY PATTERN, UNSPECIFIED HEADACHE TYPE: ICD-10-CM

## 2023-06-19 DIAGNOSIS — Z00.00 ANNUAL PHYSICAL EXAM: Primary | ICD-10-CM

## 2023-06-19 DIAGNOSIS — Z12.11 SCREEN FOR COLON CANCER: ICD-10-CM

## 2023-06-19 PROCEDURE — 99386 PREV VISIT NEW AGE 40-64: CPT | Performed by: NURSE PRACTITIONER

## 2023-06-19 NOTE — PROGRESS NOTES
ADULT ANNUAL [facility]    NAME: Milly Hansen  AGE: 52 y o  SEX: female  : 1975     DATE: 2023     Assessment and Plan:     Problem List Items Addressed This Visit        Nervous and Auditory    Lumbar radiculopathy       Other    Encounter for screening mammogram for malignant neoplasm of breast    Relevant Orders    Mammo screening bilateral w 3d & cad    Annual physical exam - Primary    Nonintractable headache     Patient with daily headaches will refer to headache specialist she is taking prn over the counter medications will check labs and patient is interested in botox injections vs a daily medication          Relevant Orders    Ambulatory Referral to Neurology    Comprehensive metabolic panel    CBC and differential    TSH, 3rd generation with Free T4 reflex    Ambulatory Referral to Neurology    Lyme Total Antibody Profile with reflex to WB    Vitamin B12    Screen for colon cancer    Relevant Orders    Cologuard    Encounter for annual routine gynecological examination    Relevant Orders    Ambulatory Referral to Obstetrics / Gynecology    Screening for lipid disorders    Relevant Orders    Lipid Panel with Direct LDL reflex    Screening for diabetes mellitus    Relevant Orders    HEMOGLOBIN A1C W/ EAG ESTIMATION       Immunizations and preventive care screenings were discussed with patient today  Appropriate education was printed on patient's after visit summary  Counseling:  Dental Health: discussed importance of regular tooth brushing, flossing, and dental visits  Injury prevention: discussed safety/seat belts, safety helmets, smoke detectors, carbon dioxide detectors, and smoking near bedding or upholstery  Sexual health: discussed sexually transmitted diseases, partner selection, use of condoms, avoidance of unintended pregnancy, and contraceptive alternatives    Exercise: the importance of regular exercise/physical activity was discussed  Recommend exercise 3-5 times per week for at least 30 minutes  BMI Counseling: Body mass index is 23 85 kg/m²  The BMI is above normal  Nutrition recommendations include decreasing portion sizes, encouraging healthy choices of fruits and vegetables, decreasing fast food intake, consuming healthier snacks, limiting drinks that contain sugar, moderation in carbohydrate intake, increasing intake of lean protein, reducing intake of saturated and trans fat and reducing intake of cholesterol  Exercise recommendations include exercising 3-5 times per week  No pharmacotherapy was ordered  Patient referred to PCP  Rationale for BMI follow-up plan is due to patient being overweight or obese  Depression Screening and Follow-up Plan: Patient was screened for depression during today's encounter  They screened negative with a PHQ-2 score of 0  Return in 1 year (on 6/19/2024)  Chief Complaint:     Chief Complaint   Patient presents with   • Headache     Daily-back of head   • Establish Care      History of Present Illness:     Adult Annual Physical   Patient here for a comprehensive physical exam  The patient reports problems - refer to HPI  Diet and Physical Activity  Diet/Nutrition: well balanced diet  Exercise: no formal exercise  Depression Screening  PHQ-2/9 Depression Screening    Little interest or pleasure in doing things: 0 - not at all  Feeling down, depressed, or hopeless: 0 - not at all  PHQ-2 Score: 0  PHQ-2 Interpretation: Negative depression screen       General Health  Sleep: gets 4-6 hours of sleep on average and unrefreshing sleep  Hearing: normal - bilateral   Vision: vision problems: due for exam and aware of her change in vision  and most recent eye exam >1 year ago  Dental: no dental visits for >1 year and brushes teeth three times daily         /GYN Health  Patient is: perimenopausal  Last menstrual period: 6/5/23 approx  Contraceptive method: not on anything  Review of Systems:   Patient presents for annual physical exam, reports constant pressure in the posterior base of her skull, reports that she has herniated disc in her neck and that she is past due for her eye exam  She does not like taking OTC medications so she has not been taking anything OTC for the headache  Headache    Review of Systems   Constitutional: Negative for chills and fever  HENT: Negative for congestion, ear pain, sinus pressure, sinus pain and sore throat  Eyes: Negative for pain and visual disturbance  Respiratory: Negative for cough and shortness of breath  Cardiovascular: Negative for chest pain, palpitations and leg swelling  Gastrointestinal: Negative for abdominal pain, constipation, diarrhea, nausea and vomiting  Endocrine: Negative  Genitourinary: Negative for dysuria, frequency, hematuria and urgency  Musculoskeletal: Positive for neck stiffness  Negative for arthralgias and back pain  Intermittent stiffness, hx of disc herniation   Skin: Negative for color change and rash  Allergic/Immunologic: Negative  Neurological: Positive for dizziness, light-headedness and headaches  Negative for seizures and syncope  Random, can not correlate with anything specifically  Denies syncope  Resolves quickly on its own  Hematological: Negative  Psychiatric/Behavioral: Negative  Negative for decreased concentration and sleep disturbance  The patient is not nervous/anxious  All other systems reviewed and are negative  Past Medical History:     History reviewed  No pertinent past medical history     Past Surgical History:     Past Surgical History:   Procedure Laterality Date   • BACK SURGERY  2007    L5-s1   • KNEE SURGERY Left 2004   • KNEE SURGERY Left 2007      Social History:     Social History     Socioeconomic History   • Marital status:      Spouse name: None   • Number of children: "None   • Years of education: None   • Highest education level: None   Occupational History   • None   Tobacco Use   • Smoking status: Former     Packs/day: 0 50     Types: Cigarettes     Quit date: 3/8/2022     Years since quittin 2   • Smokeless tobacco: Never   Vaping Use   • Vaping Use: Never used   Substance and Sexual Activity   • Alcohol use: Yes     Comment: socially   • Drug use: Not Currently   • Sexual activity: Yes     Birth control/protection: None   Other Topics Concern   • None   Social History Narrative   • None     Social Determinants of Health     Financial Resource Strain: Not on file   Food Insecurity: Not on file   Transportation Needs: Not on file   Physical Activity: Not on file   Stress: Not on file   Social Connections: Not on file   Intimate Partner Violence: Not on file   Housing Stability: Not on file      Family History:     Family History   Problem Relation Age of Onset   • Heart disease Mother    • Dementia Father       Current Medications:     No current outpatient medications on file  No current facility-administered medications for this visit  Allergies:     No Known Allergies   Physical Exam:     /82   Pulse 67   Temp 98 5 °F (36 9 °C)   Ht 5' 6 5\" (1 689 m)   Wt 68 kg (150 lb)   SpO2 98%   BMI 23 85 kg/m²     Physical Exam  Vitals and nursing note reviewed  Constitutional:       General: She is not in acute distress  Appearance: She is well-developed  HENT:      Head: Normocephalic and atraumatic  Right Ear: Tympanic membrane normal       Left Ear: Tympanic membrane normal       Nose: Nose normal       Mouth/Throat:      Mouth: Mucous membranes are moist    Eyes:      Conjunctiva/sclera: Conjunctivae normal    Cardiovascular:      Rate and Rhythm: Normal rate and regular rhythm  Heart sounds: No murmur heard  Pulmonary:      Effort: Pulmonary effort is normal  No respiratory distress  Breath sounds: Normal breath sounds     Abdominal: " Palpations: Abdomen is soft  Tenderness: There is no abdominal tenderness  Musculoskeletal:         General: No swelling  Cervical back: Neck supple  Skin:     General: Skin is warm and dry  Capillary Refill: Capillary refill takes less than 2 seconds  Neurological:      General: No focal deficit present  Mental Status: She is alert and oriented to person, place, and time     Psychiatric:         Mood and Affect: Mood normal           Paulette Segura, [address]

## 2023-06-19 NOTE — ASSESSMENT & PLAN NOTE
Patient with daily headaches will refer to headache specialist she is taking prn over the counter medications will check labs and patient is interested in botox injections vs a daily medication

## 2023-07-06 ENCOUNTER — OFFICE VISIT (OUTPATIENT)
Dept: URGENT CARE | Facility: CLINIC | Age: 48
End: 2023-07-06
Payer: COMMERCIAL

## 2023-07-06 VITALS
DIASTOLIC BLOOD PRESSURE: 87 MMHG | TEMPERATURE: 98 F | HEART RATE: 68 BPM | RESPIRATION RATE: 18 BRPM | OXYGEN SATURATION: 97 % | SYSTOLIC BLOOD PRESSURE: 157 MMHG

## 2023-07-06 DIAGNOSIS — H61.21 HEARING LOSS OF RIGHT EAR DUE TO CERUMEN IMPACTION: Primary | ICD-10-CM

## 2023-07-06 PROCEDURE — 99213 OFFICE O/P EST LOW 20 MIN: CPT | Performed by: PHYSICIAN ASSISTANT

## 2023-07-06 NOTE — PROGRESS NOTES
[facility]        NAME: Wanda Burger is a 52 y.o. female  : 1975    MRN: [id number]  DATE: 2023  TIME: 9:40 AM    Assessment and Plan   Hearing loss of right ear due to cerumen impaction [H61.21]  1. Hearing loss of right ear due to cerumen impaction              Patient Instructions   Patient Instructions   Follow-up with your primary care provider in the next 3-5 days. Any new or worsening symptoms develop get re-evaluated sooner or proceed to the ER. Follow up with PCP in 3-5 days. Proceed to  ER if symptoms worsen. Chief Complaint     Chief Complaint   Patient presents with   • Earache     She feels like she has a blocked ear. Started 3 days ago. History of Present Illness       Patient presents with right ear feeling blocked. Mildly left ear as well. Tried peroxide, ear drops and ear candles without relief. Ongoing for the past 2-3 days. Some decreased hearing. Denies drainage from the ear, congestion, runny nose, cough, or fevers. Review of Systems   Review of Systems   Constitutional: Negative for fever. HENT: Positive for ear pain and hearing loss. Negative for congestion, ear discharge, mouth sores, postnasal drip, rhinorrhea, sinus pressure, sinus pain, sore throat and trouble swallowing. Psychiatric/Behavioral: Negative for confusion. Current Medications     No current outpatient medications on file. Current Allergies     Allergies as of 2023   • (No Known Allergies)            The following portions of the patient's history were reviewed and updated as appropriate: allergies, current medications, past family history, past medical history, past social history, past surgical history and problem list.     History reviewed. No pertinent past medical history.     Past Surgical History:   Procedure Laterality Date   • BACK SURGERY      L5-s1   • KNEE SURGERY Left    • KNEE SURGERY Left        Family History Problem Relation Age of Onset   • Heart disease Mother    • Dementia Father          Medications have been verified. Objective   /87   Pulse 68   Temp 98 °F (36.7 °C)   Resp 18   SpO2 97%        Physical Exam     Physical Exam  Constitutional:       Appearance: Normal appearance. HENT:      Head: Normocephalic and atraumatic. Right Ear: Tympanic membrane, ear canal and external ear normal. There is impacted cerumen. Left Ear: Tympanic membrane, ear canal and external ear normal.      Ears:      Comments: TM visualized after lavage to be intact and WNL following lavage. Nose: Nose normal.      Mouth/Throat:      Mouth: Mucous membranes are moist.      Pharynx: Oropharynx is clear. Musculoskeletal:      Cervical back: No muscular tenderness. Neurological:      Mental Status: She is alert. Psychiatric:         Mood and Affect: Mood normal.         Behavior: Behavior normal.           Ear cerumen removal    Date/Time: 7/6/2023 8:30 AM    Performed by: Misael Merida PA-C  Authorized by: Misael Merida PA-C  Universal Protocol:  Risks and benefits: risks, benefits and alternatives were discussed  Consent given by: patient  Patient understanding: patient states understanding of the procedure being performed  Patient consent: the patient's understanding of the procedure matches consent given  Procedure consent: procedure consent matches procedure scheduled  Patient identity confirmed: verbally with patient      Patient location:  Clinic  Procedure details:     Local anesthetic:  None    Location:  R ear    Procedure type: irrigation only    Post-procedure details:     Complication:  None    Hearing quality:  Improved    Patient tolerance of procedure: Tolerated well, no immediate complications  Comments:      TM visualized to be intact and WNL following lavage.

## 2023-07-06 NOTE — PATIENT INSTRUCTIONS
Follow-up with your primary care provider in the next 3-5 days. Any new or worsening symptoms develop get re-evaluated sooner or proceed to the ER.

## 2023-07-22 LAB — COLOGUARD RESULT REPORTABLE: NEGATIVE

## 2023-08-18 ENCOUNTER — TELEPHONE (OUTPATIENT)
Dept: NEUROLOGY | Facility: CLINIC | Age: 48
End: 2023-08-18

## 2023-08-18 PROBLEM — Z13.1 SCREENING FOR DIABETES MELLITUS: Status: RESOLVED | Noted: 2023-06-19 | Resolved: 2023-08-18

## 2023-08-18 PROBLEM — Z12.11 SCREEN FOR COLON CANCER: Status: RESOLVED | Noted: 2023-06-19 | Resolved: 2023-08-18

## 2023-08-18 PROBLEM — Z13.220 SCREENING FOR LIPID DISORDERS: Status: RESOLVED | Noted: 2023-06-19 | Resolved: 2023-08-18

## 2023-08-18 NOTE — TELEPHONE ENCOUNTER
Patient calling to schedule new patient appointment for headaches. No testing done. Triage intake sent.

## 2023-09-27 ENCOUNTER — CONSULT (OUTPATIENT)
Dept: NEUROLOGY | Facility: CLINIC | Age: 48
End: 2023-09-27
Payer: COMMERCIAL

## 2023-09-27 VITALS
BODY MASS INDEX: 21.43 KG/M2 | WEIGHT: 144.7 LBS | HEIGHT: 69 IN | HEART RATE: 82 BPM | DIASTOLIC BLOOD PRESSURE: 98 MMHG | SYSTOLIC BLOOD PRESSURE: 150 MMHG | RESPIRATION RATE: 20 BRPM | OXYGEN SATURATION: 99 % | TEMPERATURE: 98.2 F

## 2023-09-27 DIAGNOSIS — M50.20 HERNIATED CERVICAL DISC: ICD-10-CM

## 2023-09-27 DIAGNOSIS — M79.18 CERVICAL MYOFASCIAL PAIN SYNDROME: Primary | ICD-10-CM

## 2023-09-27 DIAGNOSIS — G44.89 HEADACHE SYNDROME: ICD-10-CM

## 2023-09-27 DIAGNOSIS — M54.9 BACK PAIN: ICD-10-CM

## 2023-09-27 PROCEDURE — 99203 OFFICE O/P NEW LOW 30 MIN: CPT | Performed by: STUDENT IN AN ORGANIZED HEALTH CARE EDUCATION/TRAINING PROGRAM

## 2023-09-27 RX ORDER — NAPROXEN 500 MG/1
500 TABLET ORAL 2 TIMES DAILY WITH MEALS
Qty: 14 TABLET | Refills: 0 | Status: SHIPPED | OUTPATIENT
Start: 2023-09-27 | End: 2023-10-04

## 2023-09-27 NOTE — PATIENT INSTRUCTIONS
Additional Testing:   Neurodiagnostic workup: MRI Brain ordered  X-ray cervical spine    Headache Calendar  Please maintain a headache calendar  Consider using phone applications such as Migraine Joselito or [place] Migraine Tracker    Headache/migraine treatment:   Acute medications (for immediate treatment of a headache): It is ok to take ibuprofen, acetaminophen or naproxen (Advil, Tylenol,  Aleve, Excedrin) if they help your headaches you should limit these to No more than 2-3 times a week to avoid medication overuse/rebound headaches. Bridge  Naproxen Bridge  Take 500mg Naproxen twice daily for 7 days  - Avoid other NSAIDs during this time  - Take with food or milk    Lifestyle Recommendations:  [x] SLEEP - Maintain a regular sleep schedule: Adults need at least 7-8 hours of uninterrupted a night. Maintain good sleep hygiene:  Going to bed and waking up at consistent times, avoiding excessive daytime naps, avoiding caffeinated beverages in the evening, avoid excessive stimulation in the evening and generally using bed primarily for sleeping. One hour before bedtime would recommend turning lights down lower, decreasing your activity (may read quietly, listen to music at a low volume). When you get into bed, should eliminate all technology (no texting, emailing, playing with your phone, iPad or tablet in bed). [x] HYDRATION - Maintain good hydration. Drink  2L of fluid a day (4 typical small water bottles)  [x] DIET - Maintain good nutrition. In particular don't skip meals and try and eat healthy balanced meals regularly. [x] TRIGGERS - Look for other triggers and avoid them: Limit caffeine to 1-2 cups a day or less. Avoid dietary triggers that you have noticed bring on your headaches (this could include aged cheese, peanuts, MSG, aspartame and nitrates).   [x] EXERCISE - physical exercise as we all know is good for you in many ways, and not only is good for your heart, but also is beneficial for your mental health, cognitive health and  chronic pain/headaches. I would encourage at the least 5 days of physical exercise weekly for at least 30 minutes. Education and Follow-up  [x] Please call with any questions or concerns. Of course if any new concerning symptoms go to the emergency department.   [x] Follow up in 6 months

## 2023-09-27 NOTE — PROGRESS NOTES
[facility] Consult  PATIENT:  Arianna Sanchez  MRN:  [id number]  :  1975  DATE OF SERVICE:  2023  REFERRED BY: KRISTINA Kendall  PMD: KRISTINA Kendall    Assessment/Plan:     Arianna Sanchez is a very pleasant 50 y.o. female with a past medical history that includes lumbar radiculopathy referred here for evaluation of headache. Initial evaluation 2023     Ms. Hansen reports a new history of head/neck pain that started in February of this year. She denies any history of headaches as a child or growing up, but reports that she has been dealing with chronic back issues and has a history of prior back surgery as well as injections for pain in her spine. She is unsure of any clear inciting incident in February, but reports that is when she first started to notice the pain. It is located at the base of the skull on the right side without any radiation. She does notice that it tends to get worse with movement of her head/neck at times. This leads me to believe that there is a musculoskeletal component to this, but because this is new for her and has been constant since February, I will obtain an MRI of the brain for further evaluation. I have also ordered an x-ray of the cervical spine. She reports that she previously had an MRI of the cervical spine that demonstrated evidence of herniated disks so I have asked her to bring in that imaging and radiology report for our review. She was open to trying a naproxen bridge for 7 days and will update me following that. She is more inclined to try conservative therapy if possible so we can always consider physical therapy in the future if necessary. Cervical myofascial pain syndrome  Herniated cervical disc  Back pain  -     XR spine cervical 2 or 3 vw injury; Future  -     naproxen (Naprosyn) 500 mg tablet;  Take 1 tablet (500 mg total) by mouth 2 (two) times a day with meals for 7 days    Headache syndrome  -     MRI brain with and without contrast; Future    Workup:  - MRI brain with and without contrast  - XR Cervical spine    Preventative:  - we discussed headache hygiene and lifestyle factors that may improve headaches  - Currently on through other providers: None  - Past/ failed/contraindicated: Gabapentin  - future options: Gabapentin, Lyrica, Cymbalta, TCA, ONB    Acute:  - discussed not taking over-the-counter or prescription pain medications more than 2-3 days per week to prevent medication overuse/rebound headache  - Naproxen bridge  - Currently on through other providers: None  - Past/ failed/contraindicated: Flexeril  - future options:  Triptan, prochlorperazine, Toradol IM or p.o., could consider trial of 5 days of Depakote 500 mg nightly or dexamethasone 2 mg daily for prolonged migraine, Ermalinda Oppenheim, reyvow, nurtec  Patient instructions   Additional Testing:   Neurodiagnostic workup: MRI Brain ordered  X-ray cervical spine    Headache Calendar  Please maintain a headache calendar  Consider using phone applications such as Migraine [employer] or [employer] Migraine Tracker    Headache/migraine treatment:   Acute medications (for immediate treatment of a headache): It is ok to take ibuprofen, acetaminophen or naproxen (Advil, Tylenol,  Aleve, Excedrin) if they help your headaches you should limit these to No more than 2-3 times a week to avoid medication overuse/rebound headaches. Bridge  Naproxen Bridge  Take 500mg Naproxen twice daily for 7 days  - Avoid other NSAIDs during this time  - Take with food or milk    Lifestyle Recommendations:  [x] SLEEP - Maintain a regular sleep schedule: Adults need at least 7-8 hours of uninterrupted a night.  Maintain good sleep hygiene:  Going to bed and waking up at consistent times, avoiding excessive daytime naps, avoiding caffeinated beverages in the evening, avoid excessive stimulation in the evening and generally using bed primarily for sleeping. One hour before bedtime would recommend turning lights down lower, decreasing your activity (may read quietly, listen to music at a low volume). When you get into bed, should eliminate all technology (no texting, emailing, playing with your phone, iPad or tablet in bed). [x] HYDRATION - Maintain good hydration. Drink  2L of fluid a day (4 typical small water bottles)  [x] DIET - Maintain good nutrition. In particular don't skip meals and try and eat healthy balanced meals regularly. [x] TRIGGERS - Look for other triggers and avoid them: Limit caffeine to 1-2 cups a day or less. Avoid dietary triggers that you have noticed bring on your headaches (this could include aged cheese, peanuts, MSG, aspartame and nitrates). [x] EXERCISE - physical exercise as we all know is good for you in many ways, and not only is good for your heart, but also is beneficial for your mental health, cognitive health and  chronic pain/headaches. I would encourage at the least 5 days of physical exercise weekly for at least 30 minutes. Education and Follow-up  [x] Please call with any questions or concerns. Of course if any new concerning symptoms go to the emergency department. [x] Follow up in 6 months  CC: We had the pleasure of evaluating Kathie Juarez Rays in neurological consultation today. Vikram Schafer is a   right handed female who presents today for evaluation of headaches. History obtained from patient as well as available medical record review. History of Present Illness:   Current medical illnesses  or past medical history include lumbar radiculopathy      Headaches started at what age? 52years old - started in February 2023   How often do the headaches occur?   - as of 9/27/2023: 30/30 (of those, about 5 can be more severe)  What time of the day do the headaches start? No particular time of day  How long do the headaches last? Constant  Are you ever headache free?  No    Aura? without aura Where is your headache located and pain quality? Base of skull; pressure, tightness   What is the intensity of pain? Worst 10/10, Average: 4-5/10  Associated symptoms:   [x] Stiff or sore neck   [x] Dizziness (every once in a while; 1-2 times per week and lasts a few seconds)    Things that make the headache worse? Turning head    Headache triggers:  None    Have you seen someone else for headaches or pain? Yes, pain management  Have you had trigger point injection performed and how often? No  Have you had Botox injection performed and how often? No   Have you had epidural injections or transforaminal injections performed? Yes  Are you current pregnant or planning on getting pregnant? N/A  Have you ever had any Brain imaging? no    Last eye exam: 2 months ago - normal; non-dilated    What medications do you take or have you taken for your headaches?    ABORTIVE:    OTC medications: None  Prescription: None    Past/ failed/contraindicated:  OTC medications: None  Prescription: Flexeril    PREVENTIVE:   None    Past/ failed/contraindicated:  Gabapentin      LIFESTYLE  Sleep   - averages: about 4-5 hours per night  Problems falling asleep?:   Yes, sometimes  Problems staying asleep?:  Yes  - No snoring    Physical activity: Generally active    Water: about 5 bottles per day  Caffeine: 10 cups of coffee per day    Mood:   Denies history of anxiety or depression or other diagnosed mood disorder    The following portions of the patient's history were reviewed and updated as appropriate: allergies, current medications, past family history, past medical history, past social history, past surgical history and problem list.    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 No pertinent past medical history. Patient Active Problem List   Diagnosis   • Encounter for screening mammogram for malignant neoplasm of breast   • Annual physical exam   • Nonintractable headache   • Lumbar radiculopathy   • Encounter for annual routine gynecological examination       Medications:      No current outpatient medications on file. No current facility-administered medications for this visit.         Allergies:    No Known Allergies    Family History:     Family History   Problem Relation Age of Onset   • Heart disease Mother    • Dementia Father        Social History:       Social History     Socioeconomic History   • Marital status:      Spouse name: Not on file   • Number of children: Not on file   • Years of education: Not on file   • Highest education level: Not on file   Occupational History   • Not on file   Tobacco Use   • Smoking status: Former     Packs/day: 0.50     Types: Cigarettes     Quit date: 3/8/2022     Years since quittin.5   • Smokeless tobacco: Never   Vaping Use   • Vaping Use: Never used   Substance and Sexual Activity   • Alcohol use: Yes     Comment: socially   • Drug use: Not Currently   • Sexual activity: Yes     Birth control/protection: None   Other Topics Concern   • Not on file   Social History Narrative   • Not on file     Social Determinants of Health     Financial Resource Strain: Not on file   Food Insecurity: Not on file   Transportation Needs: Not on file   Physical Activity: Not on file   Stress: Not on file   Social Connections: Not on file   Intimate Partner Violence: Not on file   Housing Stability: Not on file         Objective:   Physical Exam:                                                               Vitals:            Constitutional:  /98 (BP Location: Left arm, Patient Position: Sitting, Cuff Size: Standard)   Pulse 82   Temp 98.2 °F (36.8 °C) (Temporal)   Resp 20   Ht 5' 9" (1.753 m)   Wt 65.6 kg (144 lb 11.2 oz)   SpO2 99%   BMI 21.37 kg/m²   BP Readings from Last 3 Encounters:   09/27/23 150/98   07/06/23 157/87   06/19/23 130/82     Pulse Readings from Last 3 Encounters:   09/27/23 82   07/06/23 68   06/19/23 67         Well developed, well nourished, well groomed. No dysmorphic features. HEENT:  Normocephalic atraumatic. Oropharynx is clear and moist. No oral mucosal lesions. TTP right occiput   Chest:  Respirations regular and unlabored. Cardiovascular:  Distal extremities warm without palpable edema or tenderness, no observed significant swelling. Musculoskeletal:  (see below under neurologic exam for evaluation of motor function and gait)   Skin:  warm and dry, not diaphoretic. No apparent birthmarks or stigmata of neurocutaneous disease. Psychiatric:  Normal behavior and appropriate affect       Neurological Examination:     Mental status/cognitive function:   Orientated to time, place and person. Recent and remote memory intact. Attention span and concentration as well as fund of knowledge are appropriate for age. Normal language and spontaneous speech. Cranial Nerves:  II-visual fields full. Fundi poorly visualized due to pupillary constriction  III, IV, VI-Pupils were equal, round, and reactive to light and accomodation. Extraocular movements were full and conjugate without nystagmus. Conjugate gaze, normal smooth pursuits, normal saccades   V-facial sensation symmetric. VII-facial expression symmetric, intact forehead wrinkle, strong eye closure, symmetric smile    VIII-hearing grossly intact bilaterally   IX, X-palate elevation symmetric, no dysarthria. XI-shoulder shrug strength intact    XII-tongue protrusion midline. Motor Exam: symmetric bulk and tone throughout, no pronator drift.  Power/strength 5/5 bilateral upper and lower extremities, no atrophy, fasciculations or abnormal movements noted.   Sensory: grossly intact light touch in all extremities. Reflexes: brachioradialis 2+, biceps 2+, knee 2+, ankle 2+ bilaterally. No ankle clonus  Coordination: Finger nose finger intact bilaterally, no apparent dysmetria, ataxia or tremor noted  Gait: steady casual and tandem gait. Pertinent lab results: None     Pertinent Imaging: None  Review of Systems:   Constitutional: Negative for appetite change, fatigue and fever. HENT: Negative. Negative for hearing loss, tinnitus, trouble swallowing and voice change. Eyes: Negative. Negative for photophobia, pain and visual disturbance. Respiratory: Negative. Negative for shortness of breath. Cardiovascular: Negative. Negative for palpitations. Gastrointestinal: Negative. Negative for nausea and vomiting. Endocrine: Negative. Negative for cold intolerance. Genitourinary: Negative. Negative for dysuria, frequency and urgency. Musculoskeletal: Negative for back pain, gait problem, myalgias and neck pain. Skin: Negative. Negative for rash. Allergic/Immunologic: Negative. Neurological: Positive for dizziness (once in awhile) and headaches (constant head pain, on the back of head ). Negative for tremors, seizures, syncope, facial asymmetry, speech difficulty, weakness, light-headedness and numbness. Hematological: Negative. Does not bruise/bleed easily. Psychiatric/Behavioral: Negative. Negative for confusion, hallucinations and sleep disturbance. I have spent 30 minutes with the patient today in which greater than 50% of this time was spent in counseling/coordination of care regarding Prognosis, Risks and benefits of tx options, Patient and family education, Impressions, Documenting in the medical record, Reviewing / ordering tests, medicine, procedures   and Obtaining or reviewing history  . I also spent 10 minutes non face to face for this patient the same day.      Activity Minutes   Precharting/reviewing 5 Patient care/counseling 30   Postcharting/care coordination 5       Author:  Charlene Kelly DO 9/27/2023 8:13 AM

## 2023-09-27 NOTE — PROGRESS NOTES
Review of Systems   Constitutional: Negative for appetite change, fatigue and fever. HENT: Negative. Negative for hearing loss, tinnitus, trouble swallowing and voice change. Eyes: Negative. Negative for photophobia, pain and visual disturbance. Respiratory: Negative. Negative for shortness of breath. Cardiovascular: Negative. Negative for palpitations. Gastrointestinal: Negative. Negative for nausea and vomiting. Endocrine: Negative. Negative for cold intolerance. Genitourinary: Negative. Negative for dysuria, frequency and urgency. Musculoskeletal: Negative for back pain, gait problem, myalgias and neck pain. Skin: Negative. Negative for rash. Allergic/Immunologic: Negative. Neurological: Positive for dizziness (once in awhile) and headaches (constant head pain, on the back of head ). Negative for tremors, seizures, syncope, facial asymmetry, speech difficulty, weakness, light-headedness and numbness. Hematological: Negative. Does not bruise/bleed easily. Psychiatric/Behavioral: Negative. Negative for confusion, hallucinations and sleep disturbance.

## 2023-10-17 ENCOUNTER — LAB (OUTPATIENT)
Dept: LAB | Facility: CLINIC | Age: 48
End: 2023-10-17
Payer: COMMERCIAL

## 2023-10-17 ENCOUNTER — VBI (OUTPATIENT)
Dept: ADMINISTRATIVE | Facility: OTHER | Age: 48
End: 2023-10-17

## 2023-10-17 DIAGNOSIS — Z13.1 SCREENING FOR DIABETES MELLITUS: ICD-10-CM

## 2023-10-17 DIAGNOSIS — R51.9 NONINTRACTABLE HEADACHE, UNSPECIFIED CHRONICITY PATTERN, UNSPECIFIED HEADACHE TYPE: ICD-10-CM

## 2023-10-17 DIAGNOSIS — Z13.220 SCREENING FOR LIPID DISORDERS: ICD-10-CM

## 2023-10-17 LAB
ALBUMIN SERPL BCP-MCNC: 4.1 G/DL (ref 3.5–5)
ALP SERPL-CCNC: 35 U/L (ref 34–104)
ALT SERPL W P-5'-P-CCNC: 32 U/L (ref 7–52)
ANION GAP SERPL CALCULATED.3IONS-SCNC: 8 MMOL/L
AST SERPL W P-5'-P-CCNC: 33 U/L (ref 13–39)
BASOPHILS # BLD AUTO: 0.06 THOUSANDS/ÂΜL (ref 0–0.1)
BASOPHILS NFR BLD AUTO: 1 % (ref 0–1)
BILIRUB SERPL-MCNC: 0.49 MG/DL (ref 0.2–1)
BUN SERPL-MCNC: 16 MG/DL (ref 5–25)
CALCIUM SERPL-MCNC: 9.1 MG/DL (ref 8.4–10.2)
CHLORIDE SERPL-SCNC: 104 MMOL/L (ref 96–108)
CHOLEST SERPL-MCNC: 186 MG/DL
CO2 SERPL-SCNC: 29 MMOL/L (ref 21–32)
CREAT SERPL-MCNC: 0.86 MG/DL (ref 0.6–1.3)
EOSINOPHIL # BLD AUTO: 0.02 THOUSAND/ÂΜL (ref 0–0.61)
EOSINOPHIL NFR BLD AUTO: 0 % (ref 0–6)
ERYTHROCYTE [DISTWIDTH] IN BLOOD BY AUTOMATED COUNT: 13.9 % (ref 11.6–15.1)
EST. AVERAGE GLUCOSE BLD GHB EST-MCNC: 123 MG/DL
GFR SERPL CREATININE-BSD FRML MDRD: 80 ML/MIN/1.73SQ M
GLUCOSE P FAST SERPL-MCNC: 107 MG/DL (ref 65–99)
HBA1C MFR BLD: 5.9 %
HCT VFR BLD AUTO: 42.8 % (ref 34.8–46.1)
HDLC SERPL-MCNC: 71 MG/DL
HGB BLD-MCNC: 14.3 G/DL (ref 11.5–15.4)
IMM GRANULOCYTES # BLD AUTO: 0.02 THOUSAND/UL (ref 0–0.2)
IMM GRANULOCYTES NFR BLD AUTO: 0 % (ref 0–2)
LDLC SERPL CALC-MCNC: 102 MG/DL (ref 0–100)
LYMPHOCYTES # BLD AUTO: 1.7 THOUSANDS/ÂΜL (ref 0.6–4.47)
LYMPHOCYTES NFR BLD AUTO: 25 % (ref 14–44)
MCH RBC QN AUTO: 34.1 PG (ref 26.8–34.3)
MCHC RBC AUTO-ENTMCNC: 33.4 G/DL (ref 31.4–37.4)
MCV RBC AUTO: 102 FL (ref 82–98)
MONOCYTES # BLD AUTO: 0.5 THOUSAND/ÂΜL (ref 0.17–1.22)
MONOCYTES NFR BLD AUTO: 7 % (ref 4–12)
NEUTROPHILS # BLD AUTO: 4.6 THOUSANDS/ÂΜL (ref 1.85–7.62)
NEUTS SEG NFR BLD AUTO: 67 % (ref 43–75)
NRBC BLD AUTO-RTO: 0 /100 WBCS
PLATELET # BLD AUTO: 188 THOUSANDS/UL (ref 149–390)
PMV BLD AUTO: 10.9 FL (ref 8.9–12.7)
POTASSIUM SERPL-SCNC: 4.9 MMOL/L (ref 3.5–5.3)
PROT SERPL-MCNC: 6.7 G/DL (ref 6.4–8.4)
RBC # BLD AUTO: 4.19 MILLION/UL (ref 3.81–5.12)
SODIUM SERPL-SCNC: 141 MMOL/L (ref 135–147)
T4 FREE SERPL-MCNC: 0.62 NG/DL (ref 0.61–1.12)
TRIGL SERPL-MCNC: 64 MG/DL
TSH SERPL DL<=0.05 MIU/L-ACNC: 6.2 UIU/ML (ref 0.45–4.5)
WBC # BLD AUTO: 6.9 THOUSAND/UL (ref 4.31–10.16)

## 2023-10-17 PROCEDURE — 83036 HEMOGLOBIN GLYCOSYLATED A1C: CPT

## 2023-10-17 PROCEDURE — 80061 LIPID PANEL: CPT

## 2023-10-17 PROCEDURE — 80053 COMPREHEN METABOLIC PANEL: CPT

## 2023-10-17 PROCEDURE — 85025 COMPLETE CBC W/AUTO DIFF WBC: CPT

## 2023-10-17 PROCEDURE — 84439 ASSAY OF FREE THYROXINE: CPT

## 2023-10-17 PROCEDURE — 36415 COLL VENOUS BLD VENIPUNCTURE: CPT

## 2023-10-17 PROCEDURE — 84443 ASSAY THYROID STIM HORMONE: CPT

## 2023-10-18 DIAGNOSIS — R79.89 LOW T4: Primary | ICD-10-CM

## 2023-10-21 ENCOUNTER — HOSPITAL ENCOUNTER (OUTPATIENT)
Dept: RADIOLOGY | Facility: HOSPITAL | Age: 48
Discharge: HOME | End: 2023-10-21
Payer: COMMERCIAL

## 2023-10-21 ENCOUNTER — HOSPITAL ENCOUNTER (OUTPATIENT)
Dept: MRI IMAGING | Facility: HOSPITAL | Age: 48
Discharge: HOME | End: 2023-10-21
Attending: STUDENT IN AN ORGANIZED HEALTH CARE EDUCATION/TRAINING PROGRAM
Payer: COMMERCIAL

## 2023-10-21 DIAGNOSIS — M79.18 CERVICAL MYOFASCIAL PAIN SYNDROME: ICD-10-CM

## 2023-10-21 DIAGNOSIS — G44.89 HEADACHE SYNDROME: ICD-10-CM

## 2023-10-21 DIAGNOSIS — M54.9 BACK PAIN: ICD-10-CM

## 2023-10-21 DIAGNOSIS — M50.20 HERNIATED CERVICAL DISC: ICD-10-CM

## 2023-10-21 PROCEDURE — 70553 MRI BRAIN STEM W/O & W/DYE: CPT

## 2023-10-21 PROCEDURE — G1004 CDSM NDSC: HCPCS

## 2023-10-21 PROCEDURE — A9585 GADOBUTROL INJECTION: HCPCS | Performed by: STUDENT IN AN ORGANIZED HEALTH CARE EDUCATION/TRAINING PROGRAM

## 2023-10-21 PROCEDURE — 72040 X-RAY EXAM NECK SPINE 2-3 VW: CPT

## 2023-10-21 RX ORDER — GADOBUTROL 604.72 MG/ML
6 INJECTION INTRAVENOUS
Status: COMPLETED | OUTPATIENT
Start: 2023-10-21 | End: 2023-10-21

## 2023-10-21 RX ADMIN — GADOBUTROL 6 ML: 604.72 INJECTION INTRAVENOUS at 10:09

## 2024-02-21 PROBLEM — Z01.419 ENCOUNTER FOR ANNUAL ROUTINE GYNECOLOGICAL EXAMINATION: Status: RESOLVED | Noted: 2023-06-19 | Resolved: 2024-02-21

## 2024-06-21 ENCOUNTER — OFFICE VISIT (OUTPATIENT)
Dept: FAMILY MEDICINE CLINIC | Facility: CLINIC | Age: 49
End: 2024-06-21
Payer: COMMERCIAL

## 2024-06-21 VITALS
BODY MASS INDEX: 21.59 KG/M2 | DIASTOLIC BLOOD PRESSURE: 80 MMHG | SYSTOLIC BLOOD PRESSURE: 135 MMHG | OXYGEN SATURATION: 97 % | HEART RATE: 68 BPM | HEIGHT: 69 IN | TEMPERATURE: 97.8 F | WEIGHT: 145.8 LBS

## 2024-06-21 DIAGNOSIS — Z00.00 ANNUAL PHYSICAL EXAM: Primary | ICD-10-CM

## 2024-06-21 DIAGNOSIS — Z12.31 ENCOUNTER FOR SCREENING MAMMOGRAM FOR BREAST CANCER: ICD-10-CM

## 2024-06-21 PROCEDURE — 99396 PREV VISIT EST AGE 40-64: CPT | Performed by: NURSE PRACTITIONER

## 2024-06-21 NOTE — PROGRESS NOTES
Adult Annual Physical  Name: Madison Hansen      : 1975      MRN: [id number]  Encounter Provider: KRISTINA Ellis  Encounter Date: 2024   Encounter department: [facility]    Assessment & Plan   1. Annual physical exam  -     Ambulatory Referral to Obstetrics / Gynecology; Future  2. Encounter for screening mammogram for breast cancer  -     Mammo screening bilateral w 3d & cad; Future  -     Ambulatory Referral to Obstetrics / Gynecology; Future    Immunizations and preventive care screenings were discussed with patient today. Appropriate education was printed on patient's after visit summary.    Counseling:  Injury prevention: discussed safety/seat belts, safety helmets, smoke detectors, carbon dioxide detectors, and smoking near bedding or upholstery.  Exercise: the importance of regular exercise/physical activity was discussed. Recommend exercise 3-5 times per week for at least 30 minutes.          History of Present Illness   {Disappearing Hyperlinks I Encounters * My Last Note * Since Last Visit * History :49466}  Adult Annual Physical:  Patient presents for annual physical. Patient here today for her annual physical and has no present complaints .     Diet and Physical Activity:  - Diet/Nutrition: well balanced diet.  - Exercise: walking.    Depression Screening:  - PHQ-2 Score: 0    General Health:  - Sleep: sleeps well and 7-8 hours of sleep on average.  - Hearing: normal hearing bilateral ears.  - Vision: vision problems and goes for regular eye exams.  - Dental: no dental visits for > 1 year and brushes teeth twice daily.    /GYN Health:  - Follows with GYN: no.   - Menopause: perimenopausal.     Review of Systems   Constitutional:  Negative for activity change, appetite change, chills, diaphoresis, fatigue, fever and unexpected weight change.   HENT:  Negative for congestion, ear pain, hearing loss, postnasal drip, sinus pressure, sinus pain, sneezing  "and sore throat.    Eyes:  Negative for pain, redness and visual disturbance.   Respiratory:  Negative for cough and shortness of breath.    Cardiovascular:  Negative for chest pain, palpitations and leg swelling.   Gastrointestinal:  Negative for abdominal pain, diarrhea, nausea and vomiting.   Endocrine: Negative.    Genitourinary: Negative.    Musculoskeletal:  Negative for arthralgias.   Skin: Negative.    Allergic/Immunologic: Negative.    Neurological:  Negative for dizziness and light-headedness.   Hematological: Negative.    Psychiatric/Behavioral:  Negative for behavioral problems and dysphoric mood.        Past Medical History   No past medical history on file.  Past Surgical History:   Procedure Laterality Date   • BACK SURGERY  2007    L5-s1   • KNEE SURGERY Left 2004   • KNEE SURGERY Left 2007     Family History   Problem Relation Age of Onset   • Heart disease Mother    • Dementia Father      Current Outpatient Medications on File Prior to Visit   Medication Sig Dispense Refill   • [DISCONTINUED] naproxen (Naprosyn) 500 mg tablet Take 1 tablet (500 mg total) by mouth 2 (two) times a day with meals for 7 days 14 tablet 0     No current facility-administered medications on file prior to visit.   No Known Allergies     Objective   {Disappearing Hyperlinks   Review Vitals * Enter New Vitals * Results Review * Labs * Imaging * Cardiology * Procedures * Lung Cancer Screening :76039}  /80   Pulse 68   Temp 97.8 °F (36.6 °C) (Temporal)   Ht 5' 9\" (1.753 m)   Wt 66.1 kg (145 lb 12.8 oz)   SpO2 97%   BMI 21.53 kg/m²     Physical Exam  Vitals reviewed.   Constitutional:       General: She is not in acute distress.     Appearance: She is well-developed.   HENT:      Head: Normocephalic and atraumatic.      Right Ear: Tympanic membrane normal.      Left Ear: Tympanic membrane normal.      Nose: Nose normal.      Mouth/Throat:      Mouth: Mucous membranes are moist.   Eyes:      Pupils: Pupils are " equal, round, and reactive to light.   Neck:      Thyroid: No thyromegaly.   Cardiovascular:      Rate and Rhythm: Normal rate and regular rhythm.      Heart sounds: Normal heart sounds. No murmur heard.  Pulmonary:      Effort: Pulmonary effort is normal. No respiratory distress.      Breath sounds: Normal breath sounds. No wheezing.   Abdominal:      General: Bowel sounds are normal.      Palpations: Abdomen is soft.   Musculoskeletal:         General: Normal range of motion.      Cervical back: Normal range of motion.   Skin:     General: Skin is warm and dry.   Neurological:      General: No focal deficit present.      Mental Status: She is alert and oriented to person, place, and time.   Psychiatric:         Mood and Affect: Mood normal.

## 2024-07-23 ENCOUNTER — VBI (OUTPATIENT)
Dept: ADMINISTRATIVE | Facility: OTHER | Age: 49
End: 2024-07-23

## 2024-07-23 NOTE — TELEPHONE ENCOUNTER
07/23/24 8:40 AM     Chart reviewed for Pap Smear (HPV) aka Cervical Cancer Screening ; nothing is submitted to the patient's insurance at this time.     Kamilah Lock   [facility]

## 2024-12-06 ENCOUNTER — VBI (OUTPATIENT)
Dept: ADMINISTRATIVE | Facility: OTHER | Age: 49
End: 2024-12-06

## 2024-12-06 NOTE — TELEPHONE ENCOUNTER
12/06/24 4:47 PM     Chart reviewed for Pap Smear (HPV) aka Cervical Cancer Screening ; nothing is submitted to the patient's insurance at this time.     Kamilah Lock MA   [facility]

## 2025-02-12 ENCOUNTER — CONSULT (OUTPATIENT)
Dept: OBGYN CLINIC | Facility: CLINIC | Age: 50
End: 2025-02-12
Payer: COMMERCIAL

## 2025-02-12 VITALS
DIASTOLIC BLOOD PRESSURE: 80 MMHG | HEIGHT: 69 IN | SYSTOLIC BLOOD PRESSURE: 122 MMHG | BODY MASS INDEX: 22.22 KG/M2 | WEIGHT: 150 LBS

## 2025-02-12 DIAGNOSIS — Z01.419 ENCOUNTER FOR ANNUAL ROUTINE GYNECOLOGICAL EXAMINATION: ICD-10-CM

## 2025-02-12 DIAGNOSIS — Z12.31 ENCOUNTER FOR SCREENING MAMMOGRAM FOR BREAST CANCER: ICD-10-CM

## 2025-02-12 PROBLEM — Z00.00 ANNUAL PHYSICAL EXAM: Status: RESOLVED | Noted: 2023-06-19 | Resolved: 2025-02-12

## 2025-02-12 PROCEDURE — G0476 HPV COMBO ASSAY CA SCREEN: HCPCS | Performed by: NURSE PRACTITIONER

## 2025-02-12 PROCEDURE — S0610 ANNUAL GYNECOLOGICAL EXAMINA: HCPCS | Performed by: NURSE PRACTITIONER

## 2025-02-12 PROCEDURE — G0145 SCR C/V CYTO,THINLAYER,RESCR: HCPCS | Performed by: NURSE PRACTITIONER

## 2025-02-12 NOTE — ASSESSMENT & PLAN NOTE
Pap with high risk HPV testing every 5 years, if normal.  Exercise most days of week-minimum of 150-300 minutes per week.   Obtain appropriate diet and hydration.   Calcium 1000mg (in divided doses-max 600 mg at one time) + 600 vit D daily.  Birth control refilled as requested and sent to pharmacy on file. Take as directed (ACHES reviewed). Benefits, risks and alternatives discussed/reviewed.    Annual mammogram starting at age 40, monthly breast self exam recommended.   Kegels 20 times twice daily.   Call your insurance company to verify coverage prior to completing any ordered tests.   Return to office in one year or sooner, if needed.     Orders:    Ambulatory Referral to Obstetrics / Gynecology    Liquid-based pap, screening

## 2025-02-12 NOTE — PATIENT INSTRUCTIONS
Patient Education     Lowering Your Risk of Breast Cancer   About this topic   Breast cancer is a serious illness. Breast cancer is when abnormal cells grow and divide more quickly in your breast. These cells form a growth or tumor. The abnormal cells may enter nearby tissue and spread to other parts of the body. It is the type of cancer most often seen in women. Men can have breast cancer, but it is a rare condition.  General   Some things in your life may increase your risk of breast cancer. You may not be able to change some of these. Others you can control.  You are more likely to get breast cancer if you:  Have a mother, sister, or daughter who has had breast cancer  Have used hormones for menopause for more than 5 years  Have had radiation therapy to the breast or chest in the past  Are overweight or do not exercise  Had your first menstrual period before you were 11 years old  Went through menopause after age 55  Have never been pregnant or had your first child after age 35  Have had breast cancer before  Drink alcohol in any form  Have dense breasts  Are older in age  There is no certain way to prevent breast cancer. There are things you can do to lower your chances of having breast cancer.  Keep a healthy weight. Lose weight if you are overweight. Being overweight raises your chances of having breast cancer.  Eat a healthy diet to maintain a healthy weight, such as more fruits, vegetables, and lean cuts of meat. Decrease the amount of saturated fat in your diet.  Exercise. Being active helps you keep a healthy weight.  Limit your alcohol intake or do not drink alcohol. The more alcohol you drink, the higher your risk.  Do not smoke cigarettes. Smoking can increase your risk of many types of cancer.  Breastfeed your baby. This may help protect you. The longer you breastfeed, the more protection you have.  Talk with your doctor about:  Limiting or stopping hormone therapy.  Taking certain drugs to prevent  breast cancer. For women at high risk of having breast cancer, there are a few drugs that may lower your risk.  Surgery to prevent you from having breast cancer if you are very high risk.  When do I need to call the doctor?   Changes in your breasts  A lump or area in your breast that feels different  Discharge from your nipple  Skin on your breast is dimpled or indented  You have questions or concerns about your breasts  Helpful tips   Talk to your doctor about the best kind of breast cancer screening for you.  If you want to do self breast exams, have your doctor show you the right way to do them.  Tell your doctor of any abnormal finding.  Last Reviewed Date   2021-10-04  Consumer Information Use and Disclaimer   This generalized information is a limited summary of diagnosis, treatment, and/or medication information. It is not meant to be comprehensive and should be used as a tool to help the user understand and/or assess potential diagnostic and treatment options. It does NOT include all information about conditions, treatments, medications, side effects, or risks that may apply to a specific patient. It is not intended to be medical advice or a substitute for the medical advice, diagnosis, or treatment of a health care provider based on the health care provider's examination and assessment of a patient’s specific and unique circumstances. Patients must speak with a health care provider for complete information about their health, medical questions, and treatment options, including any risks or benefits regarding use of medications. This information does not endorse any treatments or medications as safe, effective, or approved for treating a specific patient. Up[employer] and its affiliates disclaim any warranty or liability relating to this information or the use thereof. The use of this information is governed by the Terms of Use, available at  https://www.woltersSoupQubesuwer.com/en/know/clinical-effectiveness-terms   Copyright   Copyright © 2024 [employer], [employer] and its affiliates and/or licensors. All rights reserved.

## 2025-02-12 NOTE — PROGRESS NOTES
Name: Madison Hansen      : 1975      MRN: [id number]  Encounter Provider: KRISTINA Almaguer  Encounter Date: 2025   Encounter department: [facility]  :  Assessment & Plan  Encounter for annual routine gynecological examination  Pap with high risk HPV testing every 5 years, if normal.  Exercise most days of week-minimum of 150-300 minutes per week.   Obtain appropriate diet and hydration.   Calcium 1000mg (in divided doses-max 600 mg at one time) + 600 vit D daily.  Birth control refilled as requested and sent to pharmacy on file. Take as directed (ACHES reviewed). Benefits, risks and alternatives discussed/reviewed.    Annual mammogram starting at age 40, monthly breast self exam recommended.   Kegels 20 times twice daily.   Call your insurance company to verify coverage prior to completing any ordered tests.   Return to office in one year or sooner, if needed.     Orders:    Ambulatory Referral to Obstetrics / Gynecology    Liquid-based pap, screening    Encounter for screening mammogram for breast cancer  Ordered by her PCP  Orders:    Ambulatory Referral to Obstetrics / Gynecology        History of Present Illness   LMP 02/3/2025  Sexually active yes  Monogamous   Birth control none  History of abnormal pap: yes, many yrs ago  Last pap was about 15 years ago   Self breast exam yes  40+ Mammogram never had one   45+ Colon Cancer screening: cologuard 23, due again in 3 years     Any issues or concerns: none    FH Breast/Ovarian cancer: none  FH Uterine cancer: none  FH Colon cancer: none    No past medical history on file.      Past Surgical History:  2007: BACK SURGERY      Comment:  L5-s1  2004: KNEE SURGERY; Left  2007: KNEE SURGERY; Left      Scheduled Meds:  Continuous Infusions:No current facility-administered medications for this visit.    PRN Meds:.      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  Her last GYN exam was 15 years ago. She denies any issues with menstrual/vaginal bleeding.  She reports regular monthly cycles, every 3-4 weeks that last 5-6 days. She has a history of abnormal pap smears many yrs ago, a pap with cotest was done today. She denies vaginal issues. She denies pelvic pain.    Review of Systems   Constitutional:  Negative for chills, fatigue, fever and unexpected weight change.   Respiratory:  Negative for shortness of breath.    Gastrointestinal:  Negative for anal bleeding, blood in stool, constipation and diarrhea.   Genitourinary:  Negative for difficulty urinating, dysuria and hematuria.   Neurological:  Negative for weakness.   Psychiatric/Behavioral:  Negative for agitation, behavioral problems and confusion.           Objective   /80 (BP Location: Left arm, Patient Position: Sitting, Cuff Size: Standard)   Ht 5' 9\" (1.753 m)   Wt 68 kg (150 lb)   LMP 2025   BMI 22.15 kg/m²      Physical Exam  Constitutional:       General: She is not in acute distress.     Appearance: She is well-developed.   HENT:      Head: Normocephalic.   Pulmonary:      Effort: Pulmonary effort is normal.   Chest:   Breasts:     Breasts are symmetrical.      Right: No inverted nipple, mass, nipple discharge, skin change or tenderness.      Left: No inverted nipple, mass, nipple discharge, skin change or tenderness.   Abdominal:      Palpations: Abdomen is soft.   Genitourinary:     Exam position: Supine.      Labia:         Right: No rash, tenderness, lesion or injury.         Left: No rash, tenderness, lesion or " injury.       Vagina: No signs of injury and foreign body. No vaginal discharge, erythema or tenderness.      Cervix: No cervical motion tenderness, discharge or friability.      Uterus: Not deviated, not enlarged, not fixed and not tender.       Adnexa:         Right: No mass, tenderness or fullness.          Left: No mass, tenderness or fullness.     Musculoskeletal:      Cervical back: Normal range of motion and neck supple.

## 2025-02-12 NOTE — LETTER
2025     KRISTINA Ellis  [address]  [address] [place] [id number]    Patient: Madison Hansen   YOB: 1975   Date of Visit: 2025       Dear Dr. Espinoza:    Thank you for referring Madison Hansen to me for evaluation. Below are my notes for this consultation.    If you have questions, please do not hesitate to call me. I look forward to following your patient along with you.         Sincerely,        KRISTINA Almaguer        CC: No Recipients    KRISTINA Almaguer  2025  3:05 PM  Sign when Signing Visit  Name: Madison Hansen      : 1975      MRN: [id number]  Encounter Provider: KRISTINA Almaguer  Encounter Date: 2025   Encounter department: [facility]  :  Assessment & Plan  Encounter for annual routine gynecological examination  Pap with high risk HPV testing every 5 years, if normal.  Exercise most days of week-minimum of 150-300 minutes per week.   Obtain appropriate diet and hydration.   Calcium 1000mg (in divided doses-max 600 mg at one time) + 600 vit D daily.  Birth control refilled as requested and sent to pharmacy on file. Take as directed (ACHES reviewed). Benefits, risks and alternatives discussed/reviewed.    Annual mammogram starting at age 40, monthly breast self exam recommended.   Kegels 20 times twice daily.   Call your insurance company to verify coverage prior to completing any ordered tests.   Return to office in one year or sooner, if needed.     Orders:  •  Ambulatory Referral to Obstetrics / Gynecology  •  Liquid-based pap, screening    Encounter for screening mammogram for breast cancer  Ordered by her PCP  Orders:  •  Ambulatory Referral to Obstetrics / Gynecology        History of Present Illness  LMP 02/3/2025  Sexually active yes  Monogamous   Birth control none  History of abnormal pap: yes, many yrs ago  Last pap was about 15 years ago   Self breast exam  "yes  40+ Mammogram never had one   45+ Colon Cancer screening: cologuard 23, due again in 3 years     Any issues or concerns: none    FH Breast/Ovarian cancer: none  FH Uterine cancer: none  FH Colon cancer: none    No past medical history on file.      Past Surgical History:  2007: BACK SURGERY      Comment:  L5-s1  2004: KNEE SURGERY; Left  2007: KNEE SURGERY; Left      Scheduled Meds:  Continuous Infusions:No current facility-administered medications for this visit.    PRN Meds:.      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  Her last GYN exam was 15 years ago. She denies any issues with menstrual/vaginal bleeding.  She reports regular monthly cycles, every 3-4 weeks that last 5-6 days. She has a history of abnormal pap smears many yrs ago, a pap with cotest was done today. She denies vaginal issues. She denies pelvic pain.    Review of Systems   Constitutional:  Negative for chills, fatigue, fever and unexpected weight change.   Respiratory:  Negative for shortness of breath.    Gastrointestinal:  Negative for anal bleeding, blood in stool, constipation and diarrhea.   Genitourinary:  Negative for difficulty urinating, dysuria and hematuria.   Neurological:  Negative for weakness.   Psychiatric/Behavioral:  Negative for agitation, behavioral problems and confusion.           Objective  /80 (BP Location: Left arm, Patient Position: Sitting, Cuff Size: Standard)   Ht 5' 9\" (1.753 m)   Wt 68 kg (150 lb)   LMP 2025   BMI 22.15 kg/m²      Physical Exam  Constitutional:       " General: She is not in acute distress.     Appearance: She is well-developed.   HENT:      Head: Normocephalic.   Pulmonary:      Effort: Pulmonary effort is normal.   Chest:   Breasts:     Breasts are symmetrical.      Right: No inverted nipple, mass, nipple discharge, skin change or tenderness.      Left: No inverted nipple, mass, nipple discharge, skin change or tenderness.   Abdominal:      Palpations: Abdomen is soft.   Genitourinary:     Exam position: Supine.      Labia:         Right: No rash, tenderness, lesion or injury.         Left: No rash, tenderness, lesion or injury.       Vagina: No signs of injury and foreign body. No vaginal discharge, erythema or tenderness.      Cervix: No cervical motion tenderness, discharge or friability.      Uterus: Not deviated, not enlarged, not fixed and not tender.       Adnexa:         Right: No mass, tenderness or fullness.          Left: No mass, tenderness or fullness.     Musculoskeletal:      Cervical back: Normal range of motion and neck supple.

## 2025-02-13 LAB
HPV HR 12 DNA CVX QL NAA+PROBE: NEGATIVE
HPV16 DNA CVX QL NAA+PROBE: NEGATIVE
HPV18 DNA CVX QL NAA+PROBE: NEGATIVE

## 2025-02-14 ENCOUNTER — RESULTS FOLLOW-UP (OUTPATIENT)
Dept: OBGYN CLINIC | Facility: CLINIC | Age: 50
End: 2025-02-14

## 2025-02-17 LAB
LAB AP GYN PRIMARY INTERPRETATION: NORMAL
Lab: NORMAL

## 2025-05-31 ENCOUNTER — NURSE TRIAGE (OUTPATIENT)
Dept: OTHER | Facility: OTHER | Age: 50
End: 2025-05-31

## 2025-05-31 NOTE — TELEPHONE ENCOUNTER
"REASON FOR CONVERSATION: Knee Pain    SYMPTOMS: right knee pain and mild swelling     OTHER HEALTH INFORMATION: n/a    PROTOCOL DISPOSITION: See PCP Within 3 Days    CARE ADVICE PROVIDED: Appt scheduled for Monday at 4pm. Advised patient to rest knee for the next couple of days, apply local heat or ice, try ibuprofen or tylenol for pain. Callback if fever, severe pain or redness occur, or symptoms worsen. She verbalized understanding.     PRACTICE FOLLOW-UP: none      Reason for Disposition   [1] MODERATE pain (e.g., interferes with normal activities, limping) AND [2] present > 3 days    Answer Assessment - Initial Assessment Questions  1. LOCATION and RADIATION: \"Where is the pain located?\"         Right knee pain      2. SEVERITY: \"How bad is the pain?\" \"What does it keep you from doing?\"   (Scale 1-10; or mild, moderate, severe)     6/10     3. ONSET: \"When did the pain start?\" \"Does it come and go, or is it there all the time?\"        5/31    4. RECURRENT: \"Have you had this pain before?\" If Yes, ask: \"When, and what happened then?\"        Yes - patient had left knee surgeries in the past  5. SETTING: \"Has there been any recent work, exercise or other activity that involved that part of the body?\"         Patient running obstacles this morning    6. AGGRAVATING FACTORS: \"What makes the knee pain worse?\" (e.g., walking, climbing stairs, running)        Walking    7. ASSOCIATED SYMPTOMS: \"Is there any swelling or redness of the knee?\"        Mild swelling     8. OTHER SYMPTOMS: \"Do you have any other symptoms?\" (e.g., chest pain, difficulty breathing, fever, calf pain)        Denies    Patient able to walk on leg    Protocols used: Knee Pain-Adult-AH    "

## 2025-06-02 ENCOUNTER — OFFICE VISIT (OUTPATIENT)
Dept: FAMILY MEDICINE CLINIC | Facility: CLINIC | Age: 50
End: 2025-06-02
Payer: COMMERCIAL

## 2025-06-02 ENCOUNTER — APPOINTMENT (OUTPATIENT)
Dept: RADIOLOGY | Facility: CLINIC | Age: 50
End: 2025-06-02
Payer: COMMERCIAL

## 2025-06-02 VITALS
OXYGEN SATURATION: 100 % | HEIGHT: 69 IN | DIASTOLIC BLOOD PRESSURE: 92 MMHG | WEIGHT: 154 LBS | TEMPERATURE: 98.1 F | BODY MASS INDEX: 22.81 KG/M2 | SYSTOLIC BLOOD PRESSURE: 148 MMHG | HEART RATE: 88 BPM

## 2025-06-02 DIAGNOSIS — M25.561 ACUTE PAIN OF RIGHT KNEE: Primary | ICD-10-CM

## 2025-06-02 DIAGNOSIS — M25.561 ACUTE PAIN OF RIGHT KNEE: ICD-10-CM

## 2025-06-02 PROCEDURE — 99213 OFFICE O/P EST LOW 20 MIN: CPT

## 2025-06-02 PROCEDURE — 73564 X-RAY EXAM KNEE 4 OR MORE: CPT

## 2025-06-02 RX ORDER — MELOXICAM 7.5 MG/1
7.5 TABLET ORAL DAILY PRN
Qty: 30 TABLET | Refills: 0 | Status: SHIPPED | OUTPATIENT
Start: 2025-06-02

## 2025-06-02 NOTE — PROGRESS NOTES
"Name: Madison Hansen      : 1975      MRN: [id number]  Encounter Provider: Tesfaye Benoit PA-C  Encounter Date: 2025   Encounter department: [facility]    Assessment & Plan  Acute pain of right knee  Pain located rt knee for 3 day(s)  Described as laxity and soreness, and is worsened by load bearing with limited ROM.   Pt has attempted to alleviate the pain with RICE, analgesics which has caused some relief.   does recall partaking in Mud run , getting her foot stuck in the mud and feeling a pop/click when the sx started.  Current pain is rated as a 7/10 pain.  Pertinent negatives at this time include no loss of protective sensation.   pt has a personal Hx of ACL MCL injury on the left knee  Examination of the right knee reveals mild tenderness to palpation of the medial aspect of the knee as well as a palpable mild effusion  Lachman's testing unremarkable, weakly positive Dorothy's  Given history and exam findings, will proceed with x-ray of the right knee to evaluate for bony abnormality, though I do have a higher suspicion for ligamentous/tendinous injury   after discussing risk and benefits we will start meloxicam as needed   Orthopedics referral placed, follow-up orthopedics.  Orders:    meloxicam (MOBIC) 7.5 mg tablet; Take 1 tablet (7.5 mg total) by mouth daily as needed for moderate pain    XR knee 4+ vw right injury; Future    Ambulatory Referral to Orthopedic Surgery; Future         History of Present Illness     Madison Hansen is a 49 y.o. female  presenting for acute pain of her right knee.  She recently had this pain started after partaking in a run where her foot got caught in the mud.        **Note: Portions of the record may have been created with voice recognition software.  Occasional wrong word or \"sound alike\" substitutions may have occurred due to the inherent limitations of voice recognition software.  Please read the chart carefully and " "recognize, using context, where substitutions have occurred. Please contact for further clarification, when necessary.     Knee Pain       Review of Systems   Constitutional:  Negative for chills and fever.   HENT:  Negative for ear pain and sore throat.    Eyes:  Negative for pain and visual disturbance.   Respiratory:  Negative for cough and shortness of breath.    Cardiovascular:  Negative for chest pain and palpitations.   Gastrointestinal:  Negative for abdominal pain and vomiting.   Genitourinary:  Negative for dysuria and hematuria.   Musculoskeletal:  Positive for arthralgias. Negative for back pain.   Skin:  Negative for color change and rash.   Neurological:  Negative for seizures and syncope.   All other systems reviewed and are negative.    Past Medical History[1]  Past Surgical History[2]  Family History[3]  Social History[4]  Medications[5]  No Known Allergies  Immunization History   Administered Date(s) Administered    Tdap 07/18/2022     Objective   /92   Pulse 88   Temp 98.1 °F (36.7 °C)   Ht 5' 9\" (1.753 m)   Wt 69.9 kg (154 lb)   SpO2 100%   BMI 22.74 kg/m²     Physical Exam  Vitals and nursing note reviewed.   Constitutional:       General: She is not in acute distress.     Appearance: She is well-developed.   HENT:      Head: Normocephalic and atraumatic.     Eyes:      Conjunctiva/sclera: Conjunctivae normal.       Cardiovascular:      Rate and Rhythm: Normal rate and regular rhythm.      Heart sounds: No murmur heard.  Pulmonary:      Effort: Pulmonary effort is normal. No respiratory distress.      Breath sounds: Normal breath sounds.   Abdominal:      Palpations: Abdomen is soft.      Tenderness: There is no abdominal tenderness.     Musculoskeletal:         General: Swelling and tenderness present. Normal range of motion.      Cervical back: Neck supple.      Comments: Right knee, Dorothy's test weakly positive     Skin:     General: Skin is warm and dry.      Capillary Refill: " Capillary refill takes less than 2 seconds.     Neurological:      Mental Status: She is alert.     Psychiatric:         Mood and Affect: Mood normal.                [1] No past medical history on file.  [2]   Past Surgical History:  Procedure Laterality Date    BACK SURGERY  2007    L5-s1    KNEE SURGERY Left 2004    KNEE SURGERY Left 2007   [3]   Family History  Problem Relation Name Age of Onset    Heart disease Mother      Dementia Father     [4]   Social History  Tobacco Use    Smoking status: Former     Current packs/day: 0.00     Types: Cigarettes     Quit date: 3/8/2022     Years since quitting: 3.2    Smokeless tobacco: Never   Vaping Use    Vaping status: Never Used   Substance and Sexual Activity    Alcohol use: Yes     Comment: socially    Drug use: Not Currently    Sexual activity: Yes     Birth control/protection: None   [5]   No current outpatient medications on file prior to visit.

## 2025-06-03 ENCOUNTER — OFFICE VISIT (OUTPATIENT)
Dept: OBGYN CLINIC | Facility: CLINIC | Age: 50
End: 2025-06-03
Payer: COMMERCIAL

## 2025-06-03 VITALS — HEIGHT: 69 IN | BODY MASS INDEX: 22.81 KG/M2 | WEIGHT: 154 LBS

## 2025-06-03 DIAGNOSIS — M23.91 ACUTE INTERNAL DERANGEMENT OF KNEE, RIGHT: Primary | ICD-10-CM

## 2025-06-03 PROCEDURE — 99213 OFFICE O/P EST LOW 20 MIN: CPT | Performed by: ORTHOPAEDIC SURGERY

## 2025-06-03 NOTE — ASSESSMENT & PLAN NOTE
Right knee twisting injury during mud run 5/31/2025  Suspect medial meniscus tear, possible ACL sprain vs partial tear, but minimal laxity/clear endpoint  Xrays were reviewed in office, no evidence of OA  Provided hinged knee brace for support  Advised to take Meloxicam as instructed by PCP  Provided home exercises in office  Advised to ice to reduce swelling  Follow up 3 weeks,   Orders:    Durable Medical Equipment

## 2025-06-03 NOTE — PROGRESS NOTES
Name: Madiosn Hansen      : 1975       MRN: [id number]   Encounter Provider: Lewis Shannon MD   Encounter Date: 25  Encounter department: [facility] [place]     Assessment & Plan  Acute internal derangement of knee, right  Right knee twisting injury during mud run 2025  Suspect medial meniscus tear, possible ACL sprain vs partial tear, but minimal laxity/clear endpoint  Xrays were reviewed in office, no evidence of OA  Provided hinged knee brace for support  Advised to take Meloxicam as instructed by PCP  Provided home exercises in office  Advised to ice to reduce swelling  Follow up 3 weeks,   Orders:    Durable Medical Equipment    To Do Next Visit:  Will order MRI if persistent pain, swelling or locking.     ____________________________________________________  CHIEF COMPLAINT:  Chief Complaint   Patient presents with    Right Knee - Pain     SUBJECTIVE:  Madison Hansen is a 49 y.o. female who presents for an in initial evaluation of her right knee, patient states she heard and felt a snap when participating in the mud run on 2025.  She was able to finish the race with pain, noticed swelling the past two days that seems to be improving after last night. She was seen by her pcp to address the pain and mechanical symptoms of locking, and clicking. She denies injury or trauma to her right knee prior to most recent event. She was provided meloxicam yesterday, denies starting medication at this point of time.  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 Patient has history of ACL repair to left knee with revision to follow.     PAST MEDICAL HISTORY:  Past Medical History[1]    PAST SURGICAL HISTORY:  Past Surgical History[2]    FAMILY HISTORY:  Family History[3]    SOCIAL HISTORY:  Social History[4]    MEDICATIONS:  Current Medications[5]    ALLERGIES:  Allergies[6]    LABS:  HgA1c:   Lab Results  "  Component Value Date    HGBA1C 5.9 (H) 10/17/2023     BMP:   Lab Results   Component Value Date    CALCIUM 9.1 10/17/2023    K 4.9 10/17/2023    CO2 29 10/17/2023     10/17/2023    BUN 16 10/17/2023    CREATININE 0.86 10/17/2023     CBC: No components found for: \"CBC\"    _____________________________________________________  PHYSICAL EXAMINATION:  Vital signs: Ht 5' 9\" (1.753 m)   Wt 69.9 kg (154 lb)   BMI 22.74 kg/m²   General: No acute distress, awake and alert  Psychiatric: Mood and affect appear appropriate  HEENT: Trachea Midline, No torticollis, no apparent facial trauma  Cardiovascular: No audible murmurs; Extremities appear perfused  Pulmonary: No audible wheezing or stridor  Skin: No open lesions; see further details (if any) below    MUSCULOSKELETAL EXAMINATION:  Extremities:    Right Knee  Range of motion from 0 to 110.    There is no crepitus with range of motion.   There is no effusion.    There is tenderness over the medial joint line.    There is 5/5 quadriceps strength  The patient is able to perform a straight leg raise  Slight laxity to anterior drawer  but firm endpoint  Varus stress testing reveals stable at 0 and 30 degrees   Valgus stress testing reveals stable at 0 and 30 degrees  Dorothy's testing is negative   The patient is neurovascular intact distally.     _____________________________________________________  STUDIES REVIEWED:  I personally reviewed the images and my independent interpretation is as follows:  X-rays of left knee obtained 6/2/25 demonstrates no evidence of fracture or dislocation with adequate joint space maintained    PROCEDURES PERFORMED:  Procedures  No procedures were performed today     Scribe Attestation      I,:  Kylah Weller am acting as a scribe while in the presence of the attending physician.:       I,:  Lewis Shannon MD personally performed the services described in this documentation    as scribed in my presence.:                 [1] No " past medical history on file.  [2]   Past Surgical History:  Procedure Laterality Date    BACK SURGERY  2007    L5-s1    KNEE SURGERY Left 2004    KNEE SURGERY Left 2007   [3]   Family History  Problem Relation Name Age of Onset    Heart disease Mother      Dementia Father     [4]   Social History  Tobacco Use    Smoking status: Former     Current packs/day: 0.00     Types: Cigarettes     Quit date: 3/8/2022     Years since quitting: 3.2    Smokeless tobacco: Never   Vaping Use    Vaping status: Never Used   Substance Use Topics    Alcohol use: Yes     Comment: socially    Drug use: Not Currently   [5]   Current Outpatient Medications:     meloxicam (MOBIC) 7.5 mg tablet, Take 1 tablet (7.5 mg total) by mouth daily as needed for moderate pain, Disp: 30 tablet, Rfl: 0  [6] No Known Allergies

## 2025-06-11 ENCOUNTER — RESULTS FOLLOW-UP (OUTPATIENT)
Dept: FAMILY MEDICINE CLINIC | Facility: CLINIC | Age: 50
End: 2025-06-11

## 2025-07-10 ENCOUNTER — OFFICE VISIT (OUTPATIENT)
Dept: FAMILY MEDICINE CLINIC | Facility: CLINIC | Age: 50
End: 2025-07-10
Payer: COMMERCIAL

## 2025-07-10 VITALS
BODY MASS INDEX: 22.57 KG/M2 | HEIGHT: 69 IN | DIASTOLIC BLOOD PRESSURE: 82 MMHG | OXYGEN SATURATION: 98 % | TEMPERATURE: 97.9 F | HEART RATE: 72 BPM | SYSTOLIC BLOOD PRESSURE: 124 MMHG | WEIGHT: 152.4 LBS

## 2025-07-10 DIAGNOSIS — Z13.1 SCREENING FOR DIABETES MELLITUS: ICD-10-CM

## 2025-07-10 DIAGNOSIS — R53.82 CHRONIC FATIGUE: ICD-10-CM

## 2025-07-10 DIAGNOSIS — Z00.00 ANNUAL PHYSICAL EXAM: Primary | ICD-10-CM

## 2025-07-10 DIAGNOSIS — Z12.31 ENCOUNTER FOR SCREENING MAMMOGRAM FOR BREAST CANCER: ICD-10-CM

## 2025-07-10 DIAGNOSIS — Z13.220 SCREENING FOR LIPID DISORDERS: ICD-10-CM

## 2025-07-10 DIAGNOSIS — R79.89 LOW T4: ICD-10-CM

## 2025-07-10 PROCEDURE — 99396 PREV VISIT EST AGE 40-64: CPT | Performed by: NURSE PRACTITIONER

## 2025-07-10 NOTE — PROGRESS NOTES
Adult Annual Physical  Name: Madison Hansen      : 1975      MRN: [id number]  Encounter Provider: KRISTINA Ellis  Encounter Date: 7/10/2025   Encounter department: [facility]    :  Assessment & Plan  Annual physical exam         Encounter for screening mammogram for breast cancer    Orders:    Mammo screening bilateral w 3d and cad; Future    Low T4    Orders:    TSH, 3rd generation with Free T4 reflex; Future    Screening for diabetes mellitus    Orders:    Comprehensive metabolic panel; Future    Hemoglobin A1C; Future    Screening for lipid disorders    Orders:    Lipid Panel with Direct LDL reflex; Future    Chronic fatigue    Orders:    CBC and differential; Future        Preventive Screenings:    - Cervical cancer screening: screening up-to-date          History of Present Illness     Adult Annual Physical:  Patient presents for annual physical. Patien there today for her annual check up and having sore throat but is managing .     Diet and Physical Activity:  - Diet/Nutrition: well balanced diet.  - Exercise: no formal exercise and walking.    Depression Screening:  - PHQ-2 Score: 0    General Health:  - Sleep: sleeps well.  - Hearing: normal hearing bilateral ears.  - Vision: no vision problems, wears contacts and most recent eye exam < 1 year ago.  - Dental: no dental visits for > 1 year, brushes teeth twice daily and floss regularly.    /GYN Health:  - Follows with GYN: yes.   - Menopause: premenopausal.     Review of Systems   Constitutional:  Negative for activity change, appetite change, chills, diaphoresis, fatigue, fever and unexpected weight change.   HENT:  Negative for congestion, ear pain, hearing loss, postnasal drip, sinus pressure, sinus pain, sneezing and sore throat.    Eyes:  Negative for pain, redness and visual disturbance.   Respiratory:  Negative for cough and shortness of breath.    Cardiovascular:  Negative for chest pain and leg  "swelling.   Gastrointestinal:  Negative for abdominal pain, diarrhea, nausea and vomiting.   Musculoskeletal:  Negative for arthralgias.   Neurological:  Negative for dizziness and light-headedness.   Psychiatric/Behavioral:  Negative for behavioral problems and dysphoric mood.          Objective   /82 (BP Location: Right arm, Patient Position: Sitting)   Pulse 72   Temp 97.9 °F (36.6 °C) (Temporal)   Ht 5' 9\" (1.753 m)   Wt 69.1 kg (152 lb 6.4 oz)   SpO2 98%   BMI 22.51 kg/m²     Physical Exam  Constitutional:       General: She is not in acute distress.     Appearance: She is well-developed.   HENT:      Head: Normocephalic and atraumatic.     Eyes:      Pupils: Pupils are equal, round, and reactive to light.     Neck:      Thyroid: No thyromegaly.     Cardiovascular:      Rate and Rhythm: Normal rate and regular rhythm.      Heart sounds: Normal heart sounds. No murmur heard.  Pulmonary:      Effort: Pulmonary effort is normal. No respiratory distress.      Breath sounds: Normal breath sounds. No wheezing.   Abdominal:      General: Bowel sounds are normal.      Palpations: Abdomen is soft.     Musculoskeletal:         General: Normal range of motion.      Cervical back: Normal range of motion.     Skin:     General: Skin is warm and dry.     Neurological:      Mental Status: She is alert and oriented to person, place, and time.         "

## 2025-07-10 NOTE — PATIENT INSTRUCTIONS
"Patient Education     Routine physical for adults   The Basics   Written by the doctors and editors at [facility]   What is a physical? -- A physical is a routine visit, or \"check-up,\" with your doctor. You might also hear it called a \"wellness visit\" or \"preventive visit.\"  During each visit, the doctor will:   Ask about your physical and mental health   Ask about your habits, behaviors, and lifestyle   Do an exam   Give you vaccines if needed   Talk to you about any medicines you take   Give advice about your health   Answer your questions  Getting regular check-ups is an important part of taking care of your health. It can help your doctor find and treat any problems you have. But it's also important for preventing health problems.  A routine physical is different from a \"sick visit.\" A sick visit is when you see a doctor because of a health concern or problem. Since physicals are scheduled ahead of time, you can think about what you want to ask the doctor.  How often should I get a physical? -- It depends on your age and health. In general, for people age 21 years and older:   If you are younger than 50 years, you might be able to get a physical every 3 years.   If you are 50 years or older, your doctor might recommend a physical every year.  If you have an ongoing health condition, like diabetes or high blood pressure, your doctor will probably want to see you more often.  What happens during a physical? -- In general, each visit will include:   Physical exam - The doctor or nurse will check your height, weight, heart rate, and blood pressure. They will also look at your eyes and ears. They will ask about how you are feeling and whether you have any symptoms that bother you.   Medicines - It's a good idea to bring a list of all the medicines you take to each doctor visit. Your doctor will talk to you about your medicines and answer any questions. Tell them if you are having any side effects that bother you. You " "should also tell them if you are having trouble paying for any of your medicines.   Habits and behaviors - This includes:   Your diet   Your exercise habits   Whether you smoke, drink alcohol, or use drugs   Whether you are sexually active   Whether you feel safe at home  Your doctor will talk to you about things you can do to improve your health and lower your risk of health problems. They will also offer help and support. For example, if you want to quit smoking, they can give you advice and might prescribe medicines. If you want to improve your diet or get more physical activity, they can help you with this, too.   Lab tests, if needed - The tests you get will depend on your age and situation. For example, your doctor might want to check your:   Cholesterol   Blood sugar   Iron level   Vaccines - The recommended vaccines will depend on your age, health, and what vaccines you already had. Vaccines are very important because they can prevent certain serious or deadly infections.   Discussion of screening - \"Screening\" means checking for diseases or other health problems before they cause symptoms. Your doctor can recommend screening based on your age, risk, and preferences. This might include tests to check for:   Cancer, such as breast, prostate, cervical, ovarian, colorectal, prostate, lung, or skin cancer   Sexually transmitted infections, such as chlamydia and gonorrhea   Mental health conditions like depression and anxiety  Your doctor will talk to you about the different types of screening tests. They can help you decide which screenings to have. They can also explain what the results might mean.   Answering questions - The physical is a good time to ask the doctor or nurse questions about your health. If needed, they can refer you to other doctors or specialists, too.  Adults older than 65 years often need other care, too. As you get older, your doctor will talk to you about:   How to prevent falling at " home   Hearing or vision tests   Memory testing   How to take your medicines safely   Making sure that you have the help and support you need at home  All topics are updated as new evidence becomes available and our peer review process is complete.  This topic retrieved from [employer] on: May 02, 2024.  Topic 079817 Version 1.0  Release: 32.4.3 - C32.122  © 2024 Up[employer]Date, Inc[employer] and/or its affiliates. All rights reserved.  Consumer Information Use and Disclaimer   Disclaimer: This generalized information is a limited summary of diagnosis, treatment, and/or medication information. It is not meant to be comprehensive and should be used as a tool to help the user understand and/or assess potential diagnostic and treatment options. It does NOT include all information about conditions, treatments, medications, side effects, or risks that may apply to a specific patient. It is not intended to be medical advice or a substitute for the medical advice, diagnosis, or treatment of a health care provider based on the health care provider's examination and assessment of a patient's specific and unique circumstances. Patients must speak with a health care provider for complete information about their health, medical questions, and treatment options, including any risks or benefits regarding use of medications. This information does not endorse any treatments or medications as safe, effective, or approved for treating a specific patient. Up[employer], Inc[employer] and its affiliates disclaim any warranty or liability relating to this information or the use thereof.The use of this information is governed by the Terms of Use, available at https://www.woltersHealthagenuwer.com/en/know/clinical-effectiveness-terms. 2024© Up[employer]Date, Inc. and its affiliates and/or licensors. All rights reserved.  Copyright   © 2024 [employer], [employer] and/or its affiliates. All rights reserved.